# Patient Record
Sex: FEMALE | Race: WHITE | NOT HISPANIC OR LATINO | Employment: FULL TIME | URBAN - NONMETROPOLITAN AREA
[De-identification: names, ages, dates, MRNs, and addresses within clinical notes are randomized per-mention and may not be internally consistent; named-entity substitution may affect disease eponyms.]

---

## 2023-07-18 PROBLEM — R73.03 PREDIABETES: Status: ACTIVE | Noted: 2023-07-18

## 2023-07-18 PROBLEM — M93.1 KIENBOCK'S DISEASE OF LUNATE BONE OF RIGHT WRIST IN ADULT: Status: ACTIVE | Noted: 2023-07-18

## 2023-07-18 PROBLEM — R94.31 ABNORMAL EKG: Status: ACTIVE | Noted: 2023-07-18

## 2023-07-18 PROBLEM — M79.7 FIBROMYALGIA: Status: ACTIVE | Noted: 2023-07-18

## 2023-07-18 PROBLEM — M25.531 RIGHT WRIST PAIN: Status: ACTIVE | Noted: 2023-07-18

## 2023-07-18 PROBLEM — R06.02 SHORTNESS OF BREATH ON EXERTION: Status: ACTIVE | Noted: 2023-07-18

## 2023-07-18 PROBLEM — M92.211 OSTEOCHONDROSIS OF LUNATE OF RIGHT WRIST: Status: ACTIVE | Noted: 2023-07-18

## 2023-07-18 PROBLEM — J43.9 EMPHYSEMA/COPD (MULTI): Status: ACTIVE | Noted: 2023-07-18

## 2023-07-19 ENCOUNTER — OFFICE VISIT (OUTPATIENT)
Dept: PRIMARY CARE | Facility: CLINIC | Age: 63
End: 2023-07-19
Payer: COMMERCIAL

## 2023-07-19 ENCOUNTER — LAB (OUTPATIENT)
Dept: LAB | Facility: LAB | Age: 63
End: 2023-07-19
Payer: COMMERCIAL

## 2023-07-19 VITALS
SYSTOLIC BLOOD PRESSURE: 110 MMHG | HEART RATE: 86 BPM | BODY MASS INDEX: 25.39 KG/M2 | RESPIRATION RATE: 16 BRPM | OXYGEN SATURATION: 94 % | TEMPERATURE: 97.8 F | HEIGHT: 60 IN | WEIGHT: 129.3 LBS | DIASTOLIC BLOOD PRESSURE: 67 MMHG

## 2023-07-19 DIAGNOSIS — Z00.00 ANNUAL PHYSICAL EXAM: ICD-10-CM

## 2023-07-19 DIAGNOSIS — Z12.11 SCREEN FOR COLON CANCER: ICD-10-CM

## 2023-07-19 DIAGNOSIS — M17.11 PRIMARY OSTEOARTHRITIS OF RIGHT KNEE: ICD-10-CM

## 2023-07-19 DIAGNOSIS — Z00.00 ANNUAL PHYSICAL EXAM: Primary | ICD-10-CM

## 2023-07-19 DIAGNOSIS — M93.1 KIENBOCK'S DISEASE OF LUNATE BONE OF RIGHT WRIST IN ADULT: ICD-10-CM

## 2023-07-19 DIAGNOSIS — R73.03 PREDIABETES: ICD-10-CM

## 2023-07-19 DIAGNOSIS — J43.9 PULMONARY EMPHYSEMA, UNSPECIFIED EMPHYSEMA TYPE (MULTI): ICD-10-CM

## 2023-07-19 DIAGNOSIS — S83.241D TEAR OF MEDIAL MENISCUS OF RIGHT KNEE, CURRENT, UNSPECIFIED TEAR TYPE, SUBSEQUENT ENCOUNTER: ICD-10-CM

## 2023-07-19 DIAGNOSIS — Z53.20 MAMMOGRAM DECLINED: ICD-10-CM

## 2023-07-19 PROBLEM — R06.02 SHORTNESS OF BREATH ON EXERTION: Status: RESOLVED | Noted: 2023-07-18 | Resolved: 2023-07-19

## 2023-07-19 PROBLEM — S83.241A TEAR OF MEDIAL MENISCUS OF RIGHT KNEE, CURRENT: Status: ACTIVE | Noted: 2023-07-19

## 2023-07-19 PROBLEM — M25.531 RIGHT WRIST PAIN: Status: RESOLVED | Noted: 2023-07-18 | Resolved: 2023-07-19

## 2023-07-19 PROCEDURE — 1036F TOBACCO NON-USER: CPT | Performed by: FAMILY MEDICINE

## 2023-07-19 PROCEDURE — 36415 COLL VENOUS BLD VENIPUNCTURE: CPT

## 2023-07-19 PROCEDURE — 80061 LIPID PANEL: CPT

## 2023-07-19 PROCEDURE — 99396 PREV VISIT EST AGE 40-64: CPT | Performed by: FAMILY MEDICINE

## 2023-07-19 PROCEDURE — 85025 COMPLETE CBC W/AUTO DIFF WBC: CPT

## 2023-07-19 PROCEDURE — 80053 COMPREHEN METABOLIC PANEL: CPT

## 2023-07-19 PROCEDURE — 83036 HEMOGLOBIN GLYCOSYLATED A1C: CPT

## 2023-07-19 RX ORDER — VITAMIN B COMPLEX
TABLET ORAL
COMMUNITY
Start: 2010-05-17

## 2023-07-19 RX ORDER — MAGNESIUM HYDROXIDE 400 MG/5ML
SUSPENSION, ORAL (FINAL DOSE FORM) ORAL
COMMUNITY
Start: 2010-05-17

## 2023-07-19 RX ORDER — ALBUTEROL SULFATE 90 UG/1
AEROSOL, METERED RESPIRATORY (INHALATION)
COMMUNITY
End: 2023-07-19 | Stop reason: SDUPTHER

## 2023-07-19 RX ORDER — BUDESONIDE AND FORMOTEROL FUMARATE DIHYDRATE 160; 4.5 UG/1; UG/1
2 AEROSOL RESPIRATORY (INHALATION) 2 TIMES DAILY
COMMUNITY
End: 2023-07-19 | Stop reason: SDUPTHER

## 2023-07-19 RX ORDER — VIT C/E/ZN/COPPR/LUTEIN/ZEAXAN 250MG-90MG
CAPSULE ORAL
COMMUNITY
Start: 2010-05-17

## 2023-07-19 RX ORDER — BACLOFEN 20 MG
TABLET ORAL
COMMUNITY
Start: 2010-05-17

## 2023-07-19 RX ORDER — ALBUTEROL SULFATE 90 UG/1
AEROSOL, METERED RESPIRATORY (INHALATION)
Qty: 18 G | Refills: 3 | Status: SHIPPED | OUTPATIENT
Start: 2023-07-19

## 2023-07-19 RX ORDER — MELOXICAM 15 MG/1
15 TABLET ORAL DAILY
COMMUNITY
Start: 2023-06-22

## 2023-07-19 RX ORDER — LATANOPROST 50 UG/ML
SOLUTION/ DROPS OPHTHALMIC
COMMUNITY
Start: 2022-07-20

## 2023-07-19 RX ORDER — ASCORBIC ACID 500 MG
TABLET ORAL
COMMUNITY
Start: 2010-05-17

## 2023-07-19 RX ORDER — TRAMADOL HYDROCHLORIDE 50 MG/1
TABLET ORAL
COMMUNITY

## 2023-07-19 RX ORDER — BUDESONIDE AND FORMOTEROL FUMARATE DIHYDRATE 160; 4.5 UG/1; UG/1
2 AEROSOL RESPIRATORY (INHALATION) 2 TIMES DAILY
Qty: 1 EACH | Refills: 11 | Status: SHIPPED | OUTPATIENT
Start: 2023-07-19

## 2023-07-19 RX ORDER — MULTIVITAMIN
TABLET ORAL
COMMUNITY
Start: 2010-05-17

## 2023-07-19 ASSESSMENT — ENCOUNTER SYMPTOMS
FEVER: 0
HEADACHES: 0
DIARRHEA: 0
FATIGUE: 0
WEAKNESS: 0
NAUSEA: 0
CONSTIPATION: 0
NUMBNESS: 0
CHILLS: 0
PSYCHIATRIC NEGATIVE: 1
ABDOMINAL PAIN: 0
SHORTNESS OF BREATH: 0
COUGH: 0
SORE THROAT: 0
DIZZINESS: 0
VOMITING: 0
PALPITATIONS: 0
MUSCULOSKELETAL NEGATIVE: 1

## 2023-07-19 ASSESSMENT — PATIENT HEALTH QUESTIONNAIRE - PHQ9
SUM OF ALL RESPONSES TO PHQ9 QUESTIONS 1 AND 2: 0
2. FEELING DOWN, DEPRESSED OR HOPELESS: NOT AT ALL
1. LITTLE INTEREST OR PLEASURE IN DOING THINGS: NOT AT ALL

## 2023-07-19 NOTE — PROGRESS NOTES
Subjective   Patient ID: Albina Ramirez is a 62 y.o. female who presents for Annual Exam.    HPI     The patient presents for her annual wellness exam.  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 Lewis   MRI just done showed R knee severe OA with medial meniscus tear   She continues to have right medial knee pain   Has had 2 injections without much improvement     Review of Systems   Constitutional:  Negative for chills, fatigue and fever.   HENT:  Negative for congestion, ear pain and sore throat.    Eyes:  Negative for visual disturbance.   Respiratory:  Negative for cough and shortness of breath.    Cardiovascular:  Negative for chest pain, palpitations and leg swelling.   Gastrointestinal:  Negative for abdominal pain, constipation, diarrhea, nausea and vomiting.   Genitourinary: Negative.    Musculoskeletal: Negative.    Skin:  Negative for rash.   Neurological:  Negative for dizziness, weakness, numbness and headaches.   Psychiatric/Behavioral: Negative.         Objective   /67 (BP Location: Left arm, Patient Position: Sitting, BP Cuff Size: Adult)   Pulse 86   Temp 36.6 °C (97.8 °F) (Temporal)   Resp 16   Ht 1.518 m  "(4' 11.75\")   Wt 58.7 kg (129 lb 4.8 oz)   SpO2 94%   BMI 25.46 kg/m²     Physical Exam    Constitutional: Well developed, well nourished, alert and in no acute distress.  Head and Face: Normocephalic, atraumatic.  Eyes: Normal external exam. Pupils equally round and reactive to light with normal accommodation and extraocular movements intact.   ENT: External inspection of ears normal, tympanic membranes visualized and normal. Nasal mucosa, septum, and turbinates normal. Oral mucosa moist, oropharynx clear.   Neck: Supple, no lymphadenopathy or masses. Thyroid not enlarged, no palpable nodules.   Cardiovascular: Regular rate and rhythm, normal S1 and S2, no murmurs, gallops, or rubs. Radial pulses normal. No peripheral edema. No carotid bruits.   Pulmonary: No respiratory distress, lungs clear to auscultation bilaterally. No wheezes, rhonchi, rales.   Abdomen: Soft, nontender, nondistended, normal bowel sounds. No masses palpated.   Musculoskeletal: Gait normal. Muscle strength/tone normal of all 4 extremities. Normal range of motion of all extremities.   Skin: Warm, well perfused, normal skin turgor and color, no lesions or rashes noted.   Neurologic: Cranial nerves II-XII grossly intact. Sensation normal bilaterally.   Psychiatric: Mood calm and affect normal.      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 Lewis           Other Visit Diagnoses       Annual physical exam    -  Primary    " Relevant Orders    CBC and Auto Differential    Comprehensive Metabolic Panel    Lipid Panel    Screen for colon cancer        Relevant Orders    Cologuard® colon cancer screening    Mammogram declined              Nori Mendez, DO  7/19/2023

## 2023-07-19 NOTE — PATIENT INSTRUCTIONS
Recommendations for women annual wellness exam:   Make sure screenings for cervical, breast, and colon cancer are up to date if applicable- pap smears age 21-65, discuss mammogram starting at age 40, colonoscopy at age 45, earlier if positive family history for breast or colon cancer   Screen for osteoporosis with DXA bone scan starting at age 65 or sooner if risk factors present (long term steroid use, smoking, heavy alcohol use, history of fracture, rheumatoid arthritis, low body weight, family history of hip fracture)  Screening for lung cancer with low-dose CT in all adults age 50-80 who have a 20 pack-year smoking history and currently smoke or have quit within the past 15 years  Follow a healthy diet (Dash diet, Mediterranean diet)  Exercise 150 min/wk   Maintain healthy weight (BMI < 25)   Do not smoke   Alcohol in moderation (up to 1 drink/day)  Get enough sleep (7-8 hours/night)  Make sure immunizations are up to date (influenza, pneumococcal, Tdap, shingles if age > 50)  Postmenopausal women or women with osteoporosis need minimum 1,200 mg calcium and 800 IU vitamin D daily  Talk to your physician if you have concerns about depression or anxiety  Visit dentist twice yearly

## 2023-07-20 LAB
ALANINE AMINOTRANSFERASE (SGPT) (U/L) IN SER/PLAS: 24 U/L (ref 7–45)
ALBUMIN (G/DL) IN SER/PLAS: 4.2 G/DL (ref 3.4–5)
ALKALINE PHOSPHATASE (U/L) IN SER/PLAS: 89 U/L (ref 33–136)
ANION GAP IN SER/PLAS: 14 MMOL/L (ref 10–20)
ASPARTATE AMINOTRANSFERASE (SGOT) (U/L) IN SER/PLAS: 26 U/L (ref 9–39)
BASOPHILS (10*3/UL) IN BLOOD BY AUTOMATED COUNT: 0.05 X10E9/L (ref 0–0.1)
BASOPHILS/100 LEUKOCYTES IN BLOOD BY AUTOMATED COUNT: 0.7 % (ref 0–2)
BILIRUBIN TOTAL (MG/DL) IN SER/PLAS: 0.3 MG/DL (ref 0–1.2)
CALCIUM (MG/DL) IN SER/PLAS: 9.9 MG/DL (ref 8.6–10.6)
CARBON DIOXIDE, TOTAL (MMOL/L) IN SER/PLAS: 28 MMOL/L (ref 21–32)
CHLORIDE (MMOL/L) IN SER/PLAS: 104 MMOL/L (ref 98–107)
CHOLESTEROL (MG/DL) IN SER/PLAS: 176 MG/DL (ref 0–199)
CHOLESTEROL IN HDL (MG/DL) IN SER/PLAS: 61.8 MG/DL
CHOLESTEROL/HDL RATIO: 2.8
CREATININE (MG/DL) IN SER/PLAS: 0.87 MG/DL (ref 0.5–1.05)
EOSINOPHILS (10*3/UL) IN BLOOD BY AUTOMATED COUNT: 0.24 X10E9/L (ref 0–0.7)
EOSINOPHILS/100 LEUKOCYTES IN BLOOD BY AUTOMATED COUNT: 3.2 % (ref 0–6)
ERYTHROCYTE DISTRIBUTION WIDTH (RATIO) BY AUTOMATED COUNT: 13.7 % (ref 11.5–14.5)
ERYTHROCYTE MEAN CORPUSCULAR HEMOGLOBIN CONCENTRATION (G/DL) BY AUTOMATED: 32 G/DL (ref 32–36)
ERYTHROCYTE MEAN CORPUSCULAR VOLUME (FL) BY AUTOMATED COUNT: 107 FL (ref 80–100)
ERYTHROCYTES (10*6/UL) IN BLOOD BY AUTOMATED COUNT: 3.63 X10E12/L (ref 4–5.2)
ESTIMATED AVERAGE GLUCOSE FOR HBA1C: 108 MG/DL
GFR FEMALE: 76 ML/MIN/1.73M2
GLUCOSE (MG/DL) IN SER/PLAS: 102 MG/DL (ref 74–99)
HEMATOCRIT (%) IN BLOOD BY AUTOMATED COUNT: 38.8 % (ref 36–46)
HEMOGLOBIN (G/DL) IN BLOOD: 12.4 G/DL (ref 12–16)
HEMOGLOBIN A1C/HEMOGLOBIN TOTAL IN BLOOD: 5.4 %
IMMATURE GRANULOCYTES/100 LEUKOCYTES IN BLOOD BY AUTOMATED COUNT: 0.1 % (ref 0–0.9)
LDL: 83 MG/DL (ref 0–99)
LEUKOCYTES (10*3/UL) IN BLOOD BY AUTOMATED COUNT: 7.4 X10E9/L (ref 4.4–11.3)
LYMPHOCYTES (10*3/UL) IN BLOOD BY AUTOMATED COUNT: 1.73 X10E9/L (ref 1.2–4.8)
LYMPHOCYTES/100 LEUKOCYTES IN BLOOD BY AUTOMATED COUNT: 23.3 % (ref 13–44)
MONOCYTES (10*3/UL) IN BLOOD BY AUTOMATED COUNT: 0.68 X10E9/L (ref 0.1–1)
MONOCYTES/100 LEUKOCYTES IN BLOOD BY AUTOMATED COUNT: 9.1 % (ref 2–10)
NEUTROPHILS (10*3/UL) IN BLOOD BY AUTOMATED COUNT: 4.73 X10E9/L (ref 1.2–7.7)
NEUTROPHILS/100 LEUKOCYTES IN BLOOD BY AUTOMATED COUNT: 63.6 % (ref 40–80)
NRBC (PER 100 WBCS) BY AUTOMATED COUNT: 0 /100 WBC (ref 0–0)
PLATELETS (10*3/UL) IN BLOOD AUTOMATED COUNT: 288 X10E9/L (ref 150–450)
POTASSIUM (MMOL/L) IN SER/PLAS: 4.1 MMOL/L (ref 3.5–5.3)
PROTEIN TOTAL: 6.6 G/DL (ref 6.4–8.2)
SODIUM (MMOL/L) IN SER/PLAS: 142 MMOL/L (ref 136–145)
TRIGLYCERIDE (MG/DL) IN SER/PLAS: 157 MG/DL (ref 0–149)
UREA NITROGEN (MG/DL) IN SER/PLAS: 28 MG/DL (ref 6–23)
VLDL: 31 MG/DL (ref 0–40)

## 2023-07-24 ENCOUNTER — TELEPHONE (OUTPATIENT)
Dept: PRIMARY CARE | Facility: CLINIC | Age: 63
End: 2023-07-24
Payer: COMMERCIAL

## 2023-07-24 NOTE — TELEPHONE ENCOUNTER
Patient was notified and understood that PA was approved for her albuterol.   Advised patient that I will leave a vm to let pharmacy know that it is approved.     Left detailed message to notify pharmacy that her script was approved.

## 2023-08-15 LAB — NONINV COLON CA DNA+OCC BLD SCRN STL QL: NEGATIVE

## 2024-02-08 ENCOUNTER — TELEPHONE (OUTPATIENT)
Dept: PRIMARY CARE | Facility: CLINIC | Age: 64
End: 2024-02-08

## 2024-02-08 NOTE — TELEPHONE ENCOUNTER
Per patient, medical [employer] no longer will be covering brand name Symbicort    They are recommending only generic be called in from now on, I did inform her that the prescription sent in July does not specify brand name so she should not need a new prescription to be sent over    States this will be refilled next week and she will let us know if it need sent again

## 2024-08-15 DIAGNOSIS — J43.9 PULMONARY EMPHYSEMA, UNSPECIFIED EMPHYSEMA TYPE (MULTI): ICD-10-CM

## 2024-08-15 RX ORDER — BUDESONIDE AND FORMOTEROL FUMARATE DIHYDRATE 160; 4.5 UG/1; UG/1
2 AEROSOL RESPIRATORY (INHALATION) 2 TIMES DAILY
Qty: 10.2 G | Refills: 1 | Status: SHIPPED | OUTPATIENT
Start: 2024-08-15

## 2024-08-15 RX ORDER — ALBUTEROL SULFATE 90 UG/1
INHALANT RESPIRATORY (INHALATION)
Qty: 18 G | Refills: 0 | Status: SHIPPED | OUTPATIENT
Start: 2024-08-15

## 2024-09-11 PROBLEM — R94.31 ABNORMAL EKG: Status: RESOLVED | Noted: 2023-07-18 | Resolved: 2024-09-11

## 2024-09-11 PROBLEM — M93.1 KIENBOCK'S DISEASE OF LUNATE BONE OF RIGHT WRIST IN ADULT: Status: RESOLVED | Noted: 2023-07-18 | Resolved: 2024-09-11

## 2024-09-11 PROBLEM — M92.211 OSTEOCHONDROSIS OF LUNATE OF RIGHT WRIST: Status: RESOLVED | Noted: 2023-07-18 | Resolved: 2024-09-11

## 2024-09-11 NOTE — PROGRESS NOTES
"Subjective   Patient ID: Albina Ramirez is a 64 y.o. female who presents for Annual Exam.  HPI  Patient of Dr. 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   Quit smoking 2010, hx 1 ppd x 30+ years   PFTs not done due to work schedule and lack of time   No chronic cough   Breathing has improved with above inhalers   No significant illnesses this year    Pre-diabetes - recheck labs, no meds    Fibromyalgia/Knee arthritis - seeing [facility] ortho - received viscosupplements for knee - this was very helpful and it has been almost a year  Will be seeing Dr. Bonilla about the thumb  Dr. Islas ([facility]) for FM  uses NSAID's and tramadol    Objective   Visit Vitals  /67 (BP Location: Right arm, Patient Position: Sitting, BP Cuff Size: Adult)   Pulse 81   Temp 36.6 °C (97.9 °F) (Temporal)   Resp 14   Ht 1.499 m (4' 11\")   Wt 58.7 kg (129 lb 5.4 oz)   SpO2 93%   BMI 26.12 kg/m²   OB Status Postmenopausal   Smoking Status Former   BSA 1.56 m²      Physical Exam  Vitals reviewed.   Constitutional:       Appearance: Normal appearance.   Cardiovascular:      Rate and Rhythm: Normal rate and regular rhythm.   Pulmonary:      Effort: Pulmonary effort is normal.      Breath sounds: Normal breath sounds. Wheezes: fine wheezes with forced expiration.      Comments: Fine wheezes with forced expiration, good air movement throughout  Neurological:      Mental Status: She is alert.         Assessment/Plan   Problem List Items Addressed This Visit       Emphysema/COPD (Multi)    Relevant Medications    albuterol " (Ventolin HFA) 90 mcg/actuation inhaler    budesonide-formoteroL (Symbicort) 160-4.5 mcg/actuation inhaler    Prediabetes    Relevant Orders    Hemoglobin A1c     Other Visit Diagnoses       Routine general medical examination at a health care facility    -  Primary    Relevant Orders    Lipid panel    Comprehensive metabolic panel    Hemoglobin A1c    Breast cancer screening by mammogram        Relevant Orders    BI mammo bilateral screening tomosynthesis    Bruising        Relevant Orders    CBC

## 2024-09-16 ENCOUNTER — APPOINTMENT (OUTPATIENT)
Dept: PRIMARY CARE | Facility: CLINIC | Age: 64
End: 2024-09-16
Payer: COMMERCIAL

## 2024-09-16 VITALS
RESPIRATION RATE: 14 BRPM | HEIGHT: 59 IN | OXYGEN SATURATION: 93 % | TEMPERATURE: 97.9 F | BODY MASS INDEX: 26.08 KG/M2 | WEIGHT: 129.34 LBS | DIASTOLIC BLOOD PRESSURE: 67 MMHG | HEART RATE: 81 BPM | SYSTOLIC BLOOD PRESSURE: 110 MMHG

## 2024-09-16 DIAGNOSIS — Z00.00 ROUTINE GENERAL MEDICAL EXAMINATION AT A HEALTH CARE FACILITY: Primary | ICD-10-CM

## 2024-09-16 DIAGNOSIS — T14.8XXA BRUISING: ICD-10-CM

## 2024-09-16 DIAGNOSIS — Z12.31 BREAST CANCER SCREENING BY MAMMOGRAM: ICD-10-CM

## 2024-09-16 DIAGNOSIS — J43.9 PULMONARY EMPHYSEMA, UNSPECIFIED EMPHYSEMA TYPE (MULTI): ICD-10-CM

## 2024-09-16 DIAGNOSIS — R73.03 PREDIABETES: ICD-10-CM

## 2024-09-16 PROCEDURE — 1036F TOBACCO NON-USER: CPT | Performed by: FAMILY MEDICINE

## 2024-09-16 PROCEDURE — 3008F BODY MASS INDEX DOCD: CPT | Performed by: FAMILY MEDICINE

## 2024-09-16 PROCEDURE — 99396 PREV VISIT EST AGE 40-64: CPT | Performed by: FAMILY MEDICINE

## 2024-09-16 RX ORDER — BUDESONIDE AND FORMOTEROL FUMARATE DIHYDRATE 160; 4.5 UG/1; UG/1
2 AEROSOL RESPIRATORY (INHALATION) 2 TIMES DAILY
Qty: 10.2 G | Refills: 11 | Status: SHIPPED | OUTPATIENT
Start: 2024-09-16

## 2024-09-16 RX ORDER — ALBUTEROL SULFATE 90 UG/1
INHALANT RESPIRATORY (INHALATION)
Qty: 18 G | Refills: 3 | Status: SHIPPED | OUTPATIENT
Start: 2024-09-16

## 2024-09-16 NOTE — PATIENT INSTRUCTIONS
Come back in for fasting labs, nothing to eat or drink except water or black coffee for 10-12 hours.  Lab is open 7:30-4 weekdays and 8-12 on Saturdays.     Schedule mammogram over Olamide break.

## 2024-09-21 ENCOUNTER — LAB (OUTPATIENT)
Dept: LAB | Facility: LAB | Age: 64
End: 2024-09-21
Payer: COMMERCIAL

## 2024-09-21 DIAGNOSIS — R73.03 PREDIABETES: ICD-10-CM

## 2024-09-21 DIAGNOSIS — T14.8XXA BRUISING: ICD-10-CM

## 2024-09-21 DIAGNOSIS — Z00.00 ROUTINE GENERAL MEDICAL EXAMINATION AT A HEALTH CARE FACILITY: ICD-10-CM

## 2024-09-21 PROCEDURE — 85027 COMPLETE CBC AUTOMATED: CPT

## 2024-09-21 PROCEDURE — 36415 COLL VENOUS BLD VENIPUNCTURE: CPT

## 2024-09-21 PROCEDURE — 83036 HEMOGLOBIN GLYCOSYLATED A1C: CPT

## 2024-09-21 PROCEDURE — 80061 LIPID PANEL: CPT

## 2024-09-21 PROCEDURE — 80053 COMPREHEN METABOLIC PANEL: CPT

## 2024-09-22 LAB
ALBUMIN SERPL BCP-MCNC: 4.2 G/DL (ref 3.4–5)
ALP SERPL-CCNC: 67 U/L (ref 33–136)
ALT SERPL W P-5'-P-CCNC: 30 U/L (ref 7–45)
ANION GAP SERPL CALC-SCNC: 12 MMOL/L (ref 10–20)
AST SERPL W P-5'-P-CCNC: 29 U/L (ref 9–39)
BILIRUB SERPL-MCNC: 1 MG/DL (ref 0–1.2)
BUN SERPL-MCNC: 22 MG/DL (ref 6–23)
CALCIUM SERPL-MCNC: 9.4 MG/DL (ref 8.6–10.6)
CHLORIDE SERPL-SCNC: 105 MMOL/L (ref 98–107)
CHOLEST SERPL-MCNC: 180 MG/DL (ref 0–199)
CHOLESTEROL/HDL RATIO: 2.9
CO2 SERPL-SCNC: 27 MMOL/L (ref 21–32)
CREAT SERPL-MCNC: 0.84 MG/DL (ref 0.5–1.05)
EGFRCR SERPLBLD CKD-EPI 2021: 79 ML/MIN/1.73M*2
ERYTHROCYTE [DISTWIDTH] IN BLOOD BY AUTOMATED COUNT: 12.5 % (ref 11.5–14.5)
EST. AVERAGE GLUCOSE BLD GHB EST-MCNC: 105 MG/DL
GLUCOSE SERPL-MCNC: 90 MG/DL (ref 74–99)
HBA1C MFR BLD: 5.3 %
HCT VFR BLD AUTO: 42.7 % (ref 36–46)
HDLC SERPL-MCNC: 62.3 MG/DL
HGB BLD-MCNC: 13.8 G/DL (ref 12–16)
LDLC SERPL CALC-MCNC: 107 MG/DL
MCH RBC QN AUTO: 32.9 PG (ref 26–34)
MCHC RBC AUTO-ENTMCNC: 32.3 G/DL (ref 32–36)
MCV RBC AUTO: 102 FL (ref 80–100)
NON HDL CHOLESTEROL: 118 MG/DL (ref 0–149)
NRBC BLD-RTO: 0 /100 WBCS (ref 0–0)
PLATELET # BLD AUTO: 293 X10*3/UL (ref 150–450)
POTASSIUM SERPL-SCNC: 4.1 MMOL/L (ref 3.5–5.3)
PROT SERPL-MCNC: 6.7 G/DL (ref 6.4–8.2)
RBC # BLD AUTO: 4.19 X10*6/UL (ref 4–5.2)
SODIUM SERPL-SCNC: 140 MMOL/L (ref 136–145)
TRIGL SERPL-MCNC: 53 MG/DL (ref 0–149)
VLDL: 11 MG/DL (ref 0–40)
WBC # BLD AUTO: 7.2 X10*3/UL (ref 4.4–11.3)

## 2024-10-24 NOTE — PROGRESS NOTES
Subjective   Patient ID: Albina Ramirez is a 64 y.o. female who presents for Diarrhea.    HPI     Here today for diarrhea for 2-3 weeks -   Watery stools, soft   No blood or melena  Freq was 5-6 times daily at onset   No diarrhea past 2 days - has since subsided   No N/V  Slight abd cramping before BM  No new meds  No recent antibiotics  No travel   Cologuard 8/15/23 neg   No prior colonoscopy   She used imodium, pepto bismol      Current Outpatient Medications   Medication Sig Dispense Refill    albuterol (Ventolin HFA) 90 mcg/actuation inhaler INHALE 1 TO 2 PUFFS BY MOUTH EVERY 4 TO 6 HOURS AS NEEDED 18 g 3    ascorbic acid (Vitamin C) 500 mg tablet Take by mouth.      budesonide-formoteroL (Symbicort) 160-4.5 mcg/actuation inhaler Inhale 2 puffs 2 times a day. Rinse mouth after use. 10.2 g 11    cholecalciferol (Vitamin D-3) 25 MCG (1000 UT) capsule Take by mouth.      cyanocobalamin, vitamin B-12, 2,500 mcg tablet, sublingual Place under the tongue.      GLUCOSAMINE SULFATE ORAL Take by mouth.      latanoprost (Xalatan) 0.005 % ophthalmic solution Administer into affected eye(s).      magnesium oxide 500 mg tablet Take by mouth.      meloxicam (Mobic) 15 mg tablet Take 1 tablet (15 mg) by mouth once daily.      multivitamin tablet Take by mouth.      potassium gluconate 595 mg (99 mg) tablet Take by mouth.      traMADol (Ultram) 50 mg tablet TAKE 1 TABLET BY MOUTH once or TWICE DAILY AS NEEDED FOR SEVERE PAIN       No current facility-administered medications for this visit.       Review of Systems   Constitutional:  Negative for fever.   Gastrointestinal:  Positive for abdominal pain and diarrhea. Negative for blood in stool, constipation, nausea and vomiting.   Genitourinary: Negative.          Objective   /68 (BP Location: Left arm, Patient Position: Sitting, BP Cuff Size: Adult)   Pulse 70   Temp 37.1 °C (98.8 °F) (Temporal)   Resp 12   Wt 55.2 kg (121 lb 12.8 oz)   SpO2 95%   BMI 24.60 kg/m²      Physical Exam    Constitutional: Well developed, well nourished, alert and in no acute distress   Eyes: Normal external exam.   Abdomen: Soft, nontender, nondistended, +BS.  Skin: Warm, well perfused, normal skin turgor and color.   Neurologic: Cranial nerves II-XII grossly intact.   Psychiatric: Mood calm and affect normal.      Assessment/Plan     Problem List Items Addressed This Visit    None  Visit Diagnoses       Watery diarrhea    -  Primary    Relevant Orders    Occult Blood, Stool    Ova/Para + Giardia/Cryptosporidium Antigen    Stool Pathogen Panel, PCR    Calprotectin, Fecal    C. difficile, PCR    Abdominal cramping              Discussed infectious vs inflammatory causes  Stool studies ordered  Consider GI referral for colonoscopy if symptoms persist   Imodium and pepto bismol are fine to continue as needed     Nori Mendez, DO  10/25/2024

## 2024-10-25 ENCOUNTER — OFFICE VISIT (OUTPATIENT)
Dept: PRIMARY CARE | Facility: CLINIC | Age: 64
End: 2024-10-25
Payer: COMMERCIAL

## 2024-10-25 VITALS
WEIGHT: 121.8 LBS | SYSTOLIC BLOOD PRESSURE: 124 MMHG | RESPIRATION RATE: 12 BRPM | DIASTOLIC BLOOD PRESSURE: 68 MMHG | BODY MASS INDEX: 24.6 KG/M2 | TEMPERATURE: 98.8 F | OXYGEN SATURATION: 95 % | HEART RATE: 70 BPM

## 2024-10-25 DIAGNOSIS — R10.9 ABDOMINAL CRAMPING: ICD-10-CM

## 2024-10-25 DIAGNOSIS — R19.7 WATERY DIARRHEA: Primary | ICD-10-CM

## 2024-10-25 PROCEDURE — 99213 OFFICE O/P EST LOW 20 MIN: CPT | Performed by: FAMILY MEDICINE

## 2024-10-25 PROCEDURE — 1036F TOBACCO NON-USER: CPT | Performed by: FAMILY MEDICINE

## 2024-10-25 ASSESSMENT — ENCOUNTER SYMPTOMS
VOMITING: 0
BLOOD IN STOOL: 0
NAUSEA: 0
ABDOMINAL PAIN: 1
DIARRHEA: 1
CONSTIPATION: 0
FEVER: 0

## 2024-10-28 ENCOUNTER — LAB (OUTPATIENT)
Dept: LAB | Facility: LAB | Age: 64
End: 2024-10-28
Payer: COMMERCIAL

## 2024-10-28 DIAGNOSIS — R19.7 WATERY DIARRHEA: ICD-10-CM

## 2024-10-28 PROCEDURE — 87493 C DIFF AMPLIFIED PROBE: CPT

## 2024-10-28 PROCEDURE — 82270 OCCULT BLOOD FECES: CPT

## 2024-10-28 PROCEDURE — 87328 CRYPTOSPORIDIUM AG IA: CPT

## 2024-10-28 PROCEDURE — 83993 ASSAY FOR CALPROTECTIN FECAL: CPT

## 2024-10-28 PROCEDURE — 87329 GIARDIA AG IA: CPT

## 2024-10-28 PROCEDURE — 87506 IADNA-DNA/RNA PROBE TQ 6-11: CPT

## 2024-10-29 LAB — C DIF TOX TCDA+TCDB STL QL NAA+PROBE: NOT DETECTED

## 2024-10-30 LAB
C COLI+JEJ+UPSA DNA STL QL NAA+PROBE: NOT DETECTED
EC STX1 GENE STL QL NAA+PROBE: NOT DETECTED
EC STX2 GENE STL QL NAA+PROBE: NOT DETECTED
HEMOCCULT SP1 STL QL: NEGATIVE
NOROVIRUS GI + GII RNA STL NAA+PROBE: NOT DETECTED
RV RNA STL NAA+PROBE: NOT DETECTED
SALMONELLA DNA STL QL NAA+PROBE: NOT DETECTED
SHIGELLA DNA SPEC QL NAA+PROBE: NOT DETECTED
V CHOLERAE DNA STL QL NAA+PROBE: NOT DETECTED
Y ENTEROCOL DNA STL QL NAA+PROBE: NOT DETECTED

## 2024-10-31 LAB
CRYPTOSP AG STL QL IA: NEGATIVE
G LAMBLIA AG STL QL IA: NEGATIVE

## 2024-11-03 LAB
CALPROTECTIN STL-MCNT: 108 UG/G
O+P STL MICRO: NEGATIVE

## 2024-11-04 DIAGNOSIS — R10.9 ABDOMINAL CRAMPING: ICD-10-CM

## 2024-11-04 DIAGNOSIS — R19.5 ELEVATED FECAL CALPROTECTIN: Primary | ICD-10-CM

## 2024-11-04 DIAGNOSIS — R19.7 WATERY DIARRHEA: ICD-10-CM

## 2024-12-26 VITALS
TEMPERATURE: 98.3 F | RESPIRATION RATE: 4 BRPM | RESPIRATION RATE: 6 BRPM | RESPIRATION RATE: 20 BRPM | OXYGEN SATURATION: 99 % | DIASTOLIC BLOOD PRESSURE: 35 MMHG | RESPIRATION RATE: 29 BRPM | HEART RATE: 78 BPM | SYSTOLIC BLOOD PRESSURE: 122 MMHG | DIASTOLIC BLOOD PRESSURE: 59 MMHG | DIASTOLIC BLOOD PRESSURE: 30 MMHG | SYSTOLIC BLOOD PRESSURE: 79 MMHG | HEART RATE: 92 BPM | SYSTOLIC BLOOD PRESSURE: 83 MMHG | OXYGEN SATURATION: 100 % | RESPIRATION RATE: 20 BRPM | OXYGEN SATURATION: 98 % | HEART RATE: 104 BPM | DIASTOLIC BLOOD PRESSURE: 66 MMHG | OXYGEN SATURATION: 98 % | HEART RATE: 76 BPM | DIASTOLIC BLOOD PRESSURE: 43 MMHG | HEART RATE: 80 BPM | SYSTOLIC BLOOD PRESSURE: 79 MMHG | SYSTOLIC BLOOD PRESSURE: 83 MMHG | RESPIRATION RATE: 25 BRPM | DIASTOLIC BLOOD PRESSURE: 31 MMHG | HEART RATE: 72 BPM | SYSTOLIC BLOOD PRESSURE: 91 MMHG | DIASTOLIC BLOOD PRESSURE: 66 MMHG | HEART RATE: 92 BPM | SYSTOLIC BLOOD PRESSURE: 100 MMHG | DIASTOLIC BLOOD PRESSURE: 37 MMHG | SYSTOLIC BLOOD PRESSURE: 84 MMHG | SYSTOLIC BLOOD PRESSURE: 135 MMHG | SYSTOLIC BLOOD PRESSURE: 100 MMHG | SYSTOLIC BLOOD PRESSURE: 96 MMHG | HEART RATE: 76 BPM | DIASTOLIC BLOOD PRESSURE: 37 MMHG | HEART RATE: 79 BPM | RESPIRATION RATE: 4 BRPM | SYSTOLIC BLOOD PRESSURE: 84 MMHG | OXYGEN SATURATION: 94 % | OXYGEN SATURATION: 99 % | SYSTOLIC BLOOD PRESSURE: 83 MMHG | HEART RATE: 74 BPM | RESPIRATION RATE: 25 BRPM | RESPIRATION RATE: 7 BRPM | SYSTOLIC BLOOD PRESSURE: 104 MMHG | OXYGEN SATURATION: 93 % | OXYGEN SATURATION: 98 % | DIASTOLIC BLOOD PRESSURE: 70 MMHG | OXYGEN SATURATION: 93 % | SYSTOLIC BLOOD PRESSURE: 91 MMHG | SYSTOLIC BLOOD PRESSURE: 100 MMHG | DIASTOLIC BLOOD PRESSURE: 38 MMHG | SYSTOLIC BLOOD PRESSURE: 84 MMHG | HEART RATE: 74 BPM | SYSTOLIC BLOOD PRESSURE: 150 MMHG | RESPIRATION RATE: 20 BRPM | OXYGEN SATURATION: 93 % | DIASTOLIC BLOOD PRESSURE: 38 MMHG | RESPIRATION RATE: 7 BRPM | HEART RATE: 79 BPM | RESPIRATION RATE: 7 BRPM | HEART RATE: 80 BPM | DIASTOLIC BLOOD PRESSURE: 35 MMHG | OXYGEN SATURATION: 99 % | HEART RATE: 100 BPM | HEART RATE: 79 BPM | SYSTOLIC BLOOD PRESSURE: 96 MMHG | RESPIRATION RATE: 18 BRPM | HEART RATE: 76 BPM | RESPIRATION RATE: 18 BRPM | DIASTOLIC BLOOD PRESSURE: 35 MMHG | HEART RATE: 89 BPM | HEART RATE: 77 BPM | DIASTOLIC BLOOD PRESSURE: 46 MMHG | DIASTOLIC BLOOD PRESSURE: 33 MMHG | HEART RATE: 82 BPM | DIASTOLIC BLOOD PRESSURE: 30 MMHG | HEART RATE: 88 BPM | DIASTOLIC BLOOD PRESSURE: 59 MMHG | RESPIRATION RATE: 31 BRPM | HEART RATE: 88 BPM | SYSTOLIC BLOOD PRESSURE: 124 MMHG | SYSTOLIC BLOOD PRESSURE: 150 MMHG | SYSTOLIC BLOOD PRESSURE: 91 MMHG | SYSTOLIC BLOOD PRESSURE: 122 MMHG | HEART RATE: 70 BPM | DIASTOLIC BLOOD PRESSURE: 43 MMHG | SYSTOLIC BLOOD PRESSURE: 150 MMHG | SYSTOLIC BLOOD PRESSURE: 130 MMHG | SYSTOLIC BLOOD PRESSURE: 102 MMHG | DIASTOLIC BLOOD PRESSURE: 66 MMHG | DIASTOLIC BLOOD PRESSURE: 33 MMHG | RESPIRATION RATE: 31 BRPM | RESPIRATION RATE: 29 BRPM | OXYGEN SATURATION: 94 % | HEART RATE: 78 BPM | RESPIRATION RATE: 29 BRPM | HEART RATE: 89 BPM | DIASTOLIC BLOOD PRESSURE: 70 MMHG | SYSTOLIC BLOOD PRESSURE: 124 MMHG | SYSTOLIC BLOOD PRESSURE: 104 MMHG | SYSTOLIC BLOOD PRESSURE: 102 MMHG | HEART RATE: 77 BPM | DIASTOLIC BLOOD PRESSURE: 38 MMHG | HEART RATE: 82 BPM | DIASTOLIC BLOOD PRESSURE: 37 MMHG | SYSTOLIC BLOOD PRESSURE: 79 MMHG | HEART RATE: 72 BPM | DIASTOLIC BLOOD PRESSURE: 70 MMHG | RESPIRATION RATE: 4 BRPM | DIASTOLIC BLOOD PRESSURE: 31 MMHG | DIASTOLIC BLOOD PRESSURE: 64 MMHG | SYSTOLIC BLOOD PRESSURE: 130 MMHG | SYSTOLIC BLOOD PRESSURE: 102 MMHG | HEART RATE: 74 BPM | HEART RATE: 70 BPM | HEART RATE: 70 BPM | SYSTOLIC BLOOD PRESSURE: 104 MMHG | SYSTOLIC BLOOD PRESSURE: 124 MMHG | TEMPERATURE: 98.3 F | RESPIRATION RATE: 10 BRPM | SYSTOLIC BLOOD PRESSURE: 122 MMHG | SYSTOLIC BLOOD PRESSURE: 135 MMHG | HEART RATE: 92 BPM | RESPIRATION RATE: 10 BRPM | RESPIRATION RATE: 18 BRPM | HEART RATE: 100 BPM | RESPIRATION RATE: 6 BRPM | DIASTOLIC BLOOD PRESSURE: 46 MMHG | HEART RATE: 72 BPM | DIASTOLIC BLOOD PRESSURE: 46 MMHG | HEART RATE: 82 BPM | DIASTOLIC BLOOD PRESSURE: 43 MMHG | DIASTOLIC BLOOD PRESSURE: 33 MMHG | DIASTOLIC BLOOD PRESSURE: 64 MMHG | HEART RATE: 88 BPM | HEART RATE: 80 BPM | RESPIRATION RATE: 6 BRPM | HEART RATE: 104 BPM | HEART RATE: 100 BPM | RESPIRATION RATE: 31 BRPM | OXYGEN SATURATION: 100 % | HEART RATE: 77 BPM | RESPIRATION RATE: 25 BRPM | DIASTOLIC BLOOD PRESSURE: 31 MMHG | OXYGEN SATURATION: 94 % | SYSTOLIC BLOOD PRESSURE: 130 MMHG | TEMPERATURE: 98.3 F | SYSTOLIC BLOOD PRESSURE: 96 MMHG | RESPIRATION RATE: 10 BRPM | HEART RATE: 89 BPM | HEART RATE: 78 BPM | DIASTOLIC BLOOD PRESSURE: 64 MMHG | DIASTOLIC BLOOD PRESSURE: 59 MMHG | OXYGEN SATURATION: 100 % | SYSTOLIC BLOOD PRESSURE: 135 MMHG | HEART RATE: 104 BPM | DIASTOLIC BLOOD PRESSURE: 30 MMHG

## 2024-12-30 ENCOUNTER — HOSPITAL ENCOUNTER (OUTPATIENT)
Dept: RADIOLOGY | Facility: CLINIC | Age: 64
Discharge: HOME | End: 2024-12-30
Payer: COMMERCIAL

## 2024-12-30 VITALS — HEIGHT: 59 IN | WEIGHT: 121.69 LBS | BODY MASS INDEX: 24.53 KG/M2

## 2024-12-30 DIAGNOSIS — Z12.31 BREAST CANCER SCREENING BY MAMMOGRAM: ICD-10-CM

## 2024-12-30 PROCEDURE — 77067 SCR MAMMO BI INCL CAD: CPT | Performed by: RADIOLOGY

## 2024-12-30 PROCEDURE — 77067 SCR MAMMO BI INCL CAD: CPT

## 2024-12-30 PROCEDURE — 77063 BREAST TOMOSYNTHESIS BI: CPT | Performed by: RADIOLOGY

## 2025-01-01 PROBLEM — K52.89 CLINICALLY SIGNIFICANT DIARRHEA OF UNEXPLAINED ORIGIN: Status: ACTIVE | Noted: 2025-01-03

## 2025-01-02 ENCOUNTER — HOSPITAL ENCOUNTER (OUTPATIENT)
Dept: RADIOLOGY | Facility: EXTERNAL LOCATION | Age: 65
Discharge: HOME | End: 2025-01-02

## 2025-01-03 ENCOUNTER — AMBULATORY SURGICAL CENTER (OUTPATIENT)
Dept: URBAN - METROPOLITAN AREA SURGERY 12 | Facility: SURGERY | Age: 65
End: 2025-01-03
Payer: COMMERCIAL

## 2025-01-03 ENCOUNTER — AMBULATORY SURGICAL CENTER (OUTPATIENT)
Dept: URBAN - METROPOLITAN AREA SURGERY 12 | Facility: SURGERY | Age: 65
End: 2025-01-03

## 2025-01-03 DIAGNOSIS — K64.4 RESIDUAL HEMORRHOIDAL SKIN TAGS: ICD-10-CM

## 2025-01-03 DIAGNOSIS — K64.8 OTHER HEMORRHOIDS: ICD-10-CM

## 2025-01-03 DIAGNOSIS — R19.7 DIARRHEA, UNSPECIFIED: ICD-10-CM

## 2025-01-03 DIAGNOSIS — K63.89 OTHER SPECIFIED DISEASES OF INTESTINE: ICD-10-CM

## 2025-01-03 DIAGNOSIS — K52.832 LYMPHOCYTIC COLITIS: ICD-10-CM

## 2025-01-03 PROCEDURE — 45380 COLONOSCOPY AND BIOPSY: CPT | Performed by: INTERNAL MEDICINE

## 2025-03-03 ENCOUNTER — OFFICE (OUTPATIENT)
Dept: URBAN - METROPOLITAN AREA CLINIC 27 | Facility: CLINIC | Age: 65
End: 2025-03-03
Payer: COMMERCIAL

## 2025-03-03 VITALS
SYSTOLIC BLOOD PRESSURE: 151 MMHG | DIASTOLIC BLOOD PRESSURE: 74 MMHG | SYSTOLIC BLOOD PRESSURE: 143 MMHG | DIASTOLIC BLOOD PRESSURE: 71 MMHG | WEIGHT: 120 LBS | HEART RATE: 76 BPM

## 2025-03-03 DIAGNOSIS — K52.832 LYMPHOCYTIC COLITIS: ICD-10-CM

## 2025-03-03 PROCEDURE — 99213 OFFICE O/P EST LOW 20 MIN: CPT | Performed by: NURSE PRACTITIONER

## 2025-03-03 RX ORDER — CALCIUM POLYCARBOPHIL 625 MG/1
1250 TABLET, FILM COATED ORAL
Qty: 60 | Refills: 11 | Status: ACTIVE
Start: 2025-03-03

## 2025-03-15 ENCOUNTER — OFFICE VISIT (OUTPATIENT)
Dept: URGENT CARE | Age: 65
End: 2025-03-15
Payer: COMMERCIAL

## 2025-03-15 VITALS
SYSTOLIC BLOOD PRESSURE: 146 MMHG | HEIGHT: 60 IN | RESPIRATION RATE: 18 BRPM | BODY MASS INDEX: 23.56 KG/M2 | HEART RATE: 78 BPM | TEMPERATURE: 96.9 F | WEIGHT: 120 LBS | DIASTOLIC BLOOD PRESSURE: 73 MMHG | OXYGEN SATURATION: 97 %

## 2025-03-15 DIAGNOSIS — S41.111A SKIN TEAR OF RIGHT UPPER EXTREMITY: Primary | ICD-10-CM

## 2025-03-15 DIAGNOSIS — L03.113 CELLULITIS OF RIGHT UPPER EXTREMITY: ICD-10-CM

## 2025-03-15 RX ORDER — DOXYCYCLINE 100 MG/1
100 CAPSULE ORAL 2 TIMES DAILY
Qty: 20 CAPSULE | Refills: 0 | Status: SHIPPED | OUTPATIENT
Start: 2025-03-15 | End: 2025-03-25

## 2025-03-15 ASSESSMENT — ENCOUNTER SYMPTOMS
MYALGIAS: 0
FATIGUE: 0
HEADACHES: 0
FEVER: 0
WOUND: 1

## 2025-03-15 ASSESSMENT — PAIN SCALES - GENERAL: PAINLEVEL_OUTOF10: 4

## 2025-03-15 NOTE — PROGRESS NOTES
Subjective   Patient ID: Albina Ramirez is a 64 y.o. female. They present today with a chief complaint of Injury (Arm dog scratch).    History of Present Illness  Patient presents with concern for skin tear to right forearm from dog scratch. She states her friends dog jumped towards her and her claws rubbed on her arm, pt states she has paper thin skin. Incident happened 3 days ago, she has kept it covered with tegaderm and gauze. It has continued to bleed small amounts, and the dressing is stuck to the wound. She's unsure when her last tetanus booster was and would like one today.       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 07/18/2023    Personal history of diseases of the blood and blood-forming organs and certain disorders involving the immune mechanism 12/24/2020    History of anemia    Personal history of pneumonia (recurrent)     History of pneumonia    Screen for colon cancer 08/15/2023    8/15/23 cologuard neg    Screening for cervical cancer 07/16/2020 7/16/20 NILM HPV neg       Past Surgical History:   Procedure Laterality Date    KNEE SURGERY Right     TONSILLECTOMY  07/18/2014    Tonsillectomy    TUBAL LIGATION  07/18/2014    Tubal Ligation    WRIST SURGERY Right         reports that she quit smoking about 25 years ago. Her smoking use included cigarettes. She started smoking about 60 years ago. She has a 52.5 pack-year smoking history. She has been exposed to tobacco smoke. She has never used smokeless tobacco. She reports current alcohol use. She reports that  she does not use drugs.    Review of Systems  Review of Systems   Constitutional:  Negative for fatigue and fever.   Musculoskeletal:  Negative for myalgias.   Skin:  Positive for wound.   Neurological:  Negative for headaches.                                  Objective    Vitals:    03/15/25 0816   BP: 146/73   BP Location: Left arm   Patient Position: Sitting   BP Cuff Size: Adult   Pulse: 78   Resp: 18   Temp: 36.1 °C (96.9 °F)   TempSrc: Temporal   SpO2: 97%   Weight: 54.4 kg (120 lb)   Height: 1.524 m (5')     No LMP recorded. Patient is postmenopausal.    Physical Exam    Procedures    Point of Care Test & Imaging Results from this visit  No results found for this visit on 03/15/25.   No results found.    Diagnostic study results (if any) were reviewed by SUNG Gonzalez.    Assessment/Plan   Allergies, medications, history, and pertinent labs/EKGs/Imaging reviewed by SUNG Gonzalez.     Medical Decision Making  Continue supportive measures, and symptom management. Provided prescription for antibiotic and discussed wound care. Provided updated tetanus booster. F/u if s/s increase or worsen.     At time of discharge patient was clinically well-appearing and HDS for outpatient management. The patient and/or family was educated regarding diagnosis, supportive care, OTC and Rx medications. The patient and/or family was given the opportunity to ask questions prior to discharge.  They verbalized understanding of my discussion of the plans for treatment, expected course, indications to return to  or seek further evaluation in ED, and the need for timely follow up as directed.   They were provided with a work/school excuse if requested.      Orders and Diagnoses  Diagnoses and all orders for this visit:  Skin tear of right upper extremity  -     doxycycline (Vibramycin) 100 mg capsule; Take 1 capsule (100 mg) by mouth 2 times a day for 10 days. Take with at least 8 ounces (large glass) of water, do  not lie down for 30 minutes after  Cellulitis of right upper extremity  -     doxycycline (Vibramycin) 100 mg capsule; Take 1 capsule (100 mg) by mouth 2 times a day for 10 days. Take with at least 8 ounces (large glass) of water, do not lie down for 30 minutes after  Other orders  -     Tdap vaccine, age 7 years and older  (BOOSTRIX)      Medical Admin Record      Patient disposition: Home    Electronically signed by SUNG Gonzalez  8:55 AM

## 2025-03-15 NOTE — PATIENT INSTRUCTIONS
Keep the wound clean, change the bandage and gently wash once a day, or as needed due to soiling. Apply a thin layer of vaseline to a non-stick gauze, apply that directly to the wound. Then cover with gauze wrap and coban to secure the dressing. If the dressing becomes stuck to the skin gently irrigate with warm water to release the dressing.

## 2025-05-05 ENCOUNTER — HOSPITAL ENCOUNTER (OUTPATIENT)
Dept: RADIOLOGY | Facility: CLINIC | Age: 65
Discharge: HOME | End: 2025-05-05
Payer: COMMERCIAL

## 2025-05-05 ENCOUNTER — OFFICE VISIT (OUTPATIENT)
Dept: PRIMARY CARE | Facility: CLINIC | Age: 65
End: 2025-05-05
Payer: COMMERCIAL

## 2025-05-05 VITALS
BODY MASS INDEX: 22.1 KG/M2 | SYSTOLIC BLOOD PRESSURE: 138 MMHG | OXYGEN SATURATION: 93 % | HEIGHT: 60 IN | HEART RATE: 77 BPM | WEIGHT: 112.6 LBS | DIASTOLIC BLOOD PRESSURE: 68 MMHG | TEMPERATURE: 97.5 F | RESPIRATION RATE: 16 BRPM

## 2025-05-05 DIAGNOSIS — M54.6 ACUTE BILATERAL THORACIC BACK PAIN: ICD-10-CM

## 2025-05-05 DIAGNOSIS — M54.6 ACUTE BILATERAL THORACIC BACK PAIN: Primary | ICD-10-CM

## 2025-05-05 PROBLEM — L03.113 CELLULITIS OF RIGHT UPPER EXTREMITY: Status: RESOLVED | Noted: 2025-03-15 | Resolved: 2025-05-05

## 2025-05-05 PROCEDURE — 3008F BODY MASS INDEX DOCD: CPT | Performed by: FAMILY MEDICINE

## 2025-05-05 PROCEDURE — 72072 X-RAY EXAM THORAC SPINE 3VWS: CPT | Performed by: RADIOLOGY

## 2025-05-05 PROCEDURE — 72072 X-RAY EXAM THORAC SPINE 3VWS: CPT

## 2025-05-05 PROCEDURE — 99213 OFFICE O/P EST LOW 20 MIN: CPT | Performed by: FAMILY MEDICINE

## 2025-05-05 PROCEDURE — 1036F TOBACCO NON-USER: CPT | Performed by: FAMILY MEDICINE

## 2025-05-05 PROCEDURE — 96372 THER/PROPH/DIAG INJ SC/IM: CPT | Performed by: FAMILY MEDICINE

## 2025-05-05 RX ORDER — CALCIUM POLYCARBOPHIL 625 MG/1
1 TABLET, FILM COATED ORAL
COMMUNITY
Start: 2025-03-03

## 2025-05-05 RX ORDER — KETOROLAC TROMETHAMINE 30 MG/ML
60 INJECTION, SOLUTION INTRAMUSCULAR; INTRAVENOUS ONCE
Status: COMPLETED | OUTPATIENT
Start: 2025-05-05 | End: 2025-05-05

## 2025-05-05 RX ADMIN — KETOROLAC TROMETHAMINE 60 MG: 30 INJECTION, SOLUTION INTRAMUSCULAR; INTRAVENOUS at 15:59

## 2025-05-05 NOTE — PROGRESS NOTES
Subjective   Patient ID: Albina Ramirez is a 64 y.o. female who presents for Flank Pain (Bilateral, worsening x approx 1 week/Pt denies fever, cough and urinary sxs/No known injury).  History of Present Illness  Albina Ramirez is a 64 year old female who presents with bilateral mid-back pain.    She has been experiencing bilateral mid-back pain  at the level of the lower thoracic spine for approximately one week. The pain is located around the spine and radiates around her torso. Leaning back exacerbates the pain, and she describes a sensation of her ribs being out of place, particularly when bending over. There is a 'catching' feeling on both sides.    There are no preceding events that could have triggered the pain, and this is the first occurrence of such symptoms. She has no history of back issues and has never had her low back x-rayed, although she has had x-rays of her chest and knees in the past. A thoracic spine x-ray in 2016 showed an old compression fracture, but she has not had problems since then.    Bowel and urinary functions are normal. No coughing or breathing issues, although she mentions that coughing would be extremely painful. She has not noticed any changes in the curvature of her back over time and does not feel different when sitting in a chair.    For pain management, she has been taking tramadol (two per day), Aleve, and Advil, but none have provided significant relief. She also uses an ice pack and a heating pad. She was on prednisone for gastrointestinal issues in March and for her thumb around Thanksgiving, with an injection following the oral course.    She lives alone and does not have any family nearby.    Objective     /68 (BP Location: Right arm, Patient Position: Sitting, BP Cuff Size: Adult)   Pulse 77   Temp 36.4 °C (97.5 °F) (Temporal)   Resp 16   Ht 1.524 m (5')   Wt 51.1 kg (112 lb 9.6 oz)   SpO2 93%   BMI 21.99 kg/m²      Physical Exam   Patient has a pronounced  thoracic kyphosis, she is generally tender over the thoracic spine and the trunk to the right and left of the spine and into the axillary line. Heart has RRR, no murmur, lungs are CTAB. She is able to stand and sit easily from the chair.    Results  RADIOLOGY  Thoracic spine X-ray: Compression fracture (2016)  Knee X-ray: Chondrocalcinosis (2022)       Assessment & Plan  Pain of thoracic spine - likely related to kyphosis   History of compression fracture of the thoracic spine with recent exacerbation of severe, persistent bilateral mid-back pain, unrelieved by current analgesics. No prior similar episodes. Differential includes muscular strain due to postural changes and osteoporosis-related changes. Concern for a new compression fracture. Previous x-ray from 2016 showed a compression fracture; no recent imaging has been performed. Discussed potential for active compression fracture and vertebroplasty if confirmed. Explained x-ray's role in determining next steps. Discussed Toradol injection for immediate, non-narcotic pain relief without impairing driving. Considered muscle relaxants for nighttime use, with caution due to sedative effects.  - Order thoracic spine x-ray to assess for new or worsening compression fracture.  - Administer Toradol injection for immediate pain relief.  - Prescribe muscle relaxants for nighttime use to aid in pain management and sleep.  - Consider physical therapy for posture and muscle strengthening if x-ray does not show acute fracture.  - Advise continuation of current pain management regimen with tramadol, Aleve, and Advil as needed.    Patricia Meier MD     This medical note was created with the assistance of artificial intelligence (AI) for documentation purposes. The content has been reviewed and confirmed by the healthcare provider for accuracy and completeness. Patient consented to the use of audio recording and use of AI during their visit.

## 2025-05-06 ENCOUNTER — TELEPHONE (OUTPATIENT)
Dept: PRIMARY CARE | Facility: CLINIC | Age: 65
End: 2025-05-06
Payer: COMMERCIAL

## 2025-05-06 DIAGNOSIS — M54.6 ACUTE BILATERAL THORACIC BACK PAIN: Primary | ICD-10-CM

## 2025-05-06 RX ORDER — CYCLOBENZAPRINE HCL 10 MG
10 TABLET ORAL 3 TIMES DAILY PRN
Qty: 30 TABLET | Refills: 0 | Status: SHIPPED | OUTPATIENT
Start: 2025-05-06 | End: 2025-07-05

## 2025-05-06 NOTE — TELEPHONE ENCOUNTER
There was no medication sent in yesterday, we gave an injection in the office yesterday for help with pain. Muscle relaxers were discussed, I have sent this in to the pharmacy now.

## 2025-05-06 NOTE — TELEPHONE ENCOUNTER
Pt called in stating yesterday she saw Hardeep and she was to prescribed a medication for back pain and states the medication that was sent over prior is incorrect and wants to know will the new medication for her back be sent over to pharmacy    On call added

## 2025-05-07 DIAGNOSIS — S22.000A THORACIC COMPRESSION FRACTURE, CLOSED, INITIAL ENCOUNTER (MULTI): Primary | ICD-10-CM

## 2025-05-30 ENCOUNTER — APPOINTMENT (OUTPATIENT)
Dept: NEUROSURGERY | Facility: CLINIC | Age: 65
End: 2025-05-30
Payer: COMMERCIAL

## 2025-05-30 VITALS
HEART RATE: 77 BPM | BODY MASS INDEX: 21.6 KG/M2 | SYSTOLIC BLOOD PRESSURE: 130 MMHG | TEMPERATURE: 97.7 F | DIASTOLIC BLOOD PRESSURE: 82 MMHG | HEIGHT: 60 IN | WEIGHT: 110 LBS

## 2025-05-30 DIAGNOSIS — S22.000A THORACIC COMPRESSION FRACTURE, CLOSED, INITIAL ENCOUNTER (MULTI): ICD-10-CM

## 2025-05-30 PROCEDURE — 1036F TOBACCO NON-USER: CPT | Performed by: NURSE PRACTITIONER

## 2025-05-30 PROCEDURE — 3008F BODY MASS INDEX DOCD: CPT | Performed by: NURSE PRACTITIONER

## 2025-05-30 PROCEDURE — 99204 OFFICE O/P NEW MOD 45 MIN: CPT | Performed by: NURSE PRACTITIONER

## 2025-05-30 RX ORDER — LIDOCAINE 50 MG/G
1 PATCH TOPICAL DAILY
Qty: 14 PATCH | Refills: 0 | Status: SHIPPED | OUTPATIENT
Start: 2025-05-30 | End: 2025-06-29

## 2025-05-30 RX ORDER — HYDROCODONE BITARTRATE AND ACETAMINOPHEN 5; 325 MG/1; MG/1
1 TABLET ORAL EVERY 6 HOURS PRN
Qty: 28 TABLET | Refills: 0 | Status: SHIPPED | OUTPATIENT
Start: 2025-05-30 | End: 2025-06-06

## 2025-05-30 ASSESSMENT — PATIENT HEALTH QUESTIONNAIRE - PHQ9
1. LITTLE INTEREST OR PLEASURE IN DOING THINGS: NOT AT ALL
2. FEELING DOWN, DEPRESSED OR HOPELESS: NOT AT ALL
SUM OF ALL RESPONSES TO PHQ9 QUESTIONS 1 & 2: 0

## 2025-05-30 ASSESSMENT — PAIN SCALES - GENERAL: PAINLEVEL_OUTOF10: 7

## 2025-05-30 NOTE — PROGRESS NOTES
[facility] [place]  Department of Neurological Surgery  New Patient Visit    History of Present Illness:     Albina Ramirez is a 64 y.o. year old female who presents to the spine clinic with T7 and T8 compression fractures.  Patient states that about 2 months ago she started experiencing some thoracic back pain that radiated around to her chest.  She spoke with her daughter who is a physical therapist in [place] and was doing some exercises that did not help her alleviate any of the pain symptoms.  She then presented in early May to her PCP who ordered a thoracic x-ray which demonstrated T7 and T8 compression fractures.  Patient does not recall any inciting events however is 64 and postmenopausal and has never been worked up for osteoporosis which could be a contributing factor.  Due to her diagnosis of fibromyalgia she does follow with pain medicine team and reached out to them for pain control and they increased her tramadol which has not helped.  In order to get any additional dosing of a different medication she needs to have an appointment with the physician which is scheduled for 2025 but until then she has been suffering and has been unable to obtain any additional pain medications.  She is trialed Voltaren gel which she had at home which did not help her any significant amount.    Chief Complaint   Patient presents with    New Patient Visit     Patient is here today for c/o upper back pain. She describes the pain as a constant stabbing and muscle spasms.      systems reviewed and negative other than what is listed in the history of present illness  Problem List[1]  Medical History[2]  Surgical History[3]  Social History     Tobacco Use    Smoking status: Former     Current packs/day: 0.00     Average packs/day: 1.5 packs/day for 35.0 years (52.5 ttl pk-yrs)     Types: Cigarettes     Start date:      Quit date: 2000     Years since quittin.4     Passive exposure: Past     Smokeless tobacco: Never   Substance Use Topics    Alcohol use: Not Currently     family history includes Breast cancer in her mother and sister; Heart attack in her father.  Current Medications[4]  Allergies[5]    Physical Examination:     General: Well developed, awake/alert/oriented x3, no distress, alert and cooperative  Skin: Warm and dry, no lesions, no rashes  ENMT: Mucous membranes moist, no apparent injury, no lesions seen  Head/Neck: Neck Supple, no apparent injury  Respiratory/Thorax: Normal breath sounds with good chest expansion, thorax symmetric  Cardiovascular: No pitting edema, no JVD    Motor Strength: 5/5 Throughout bilateral upper extremities and bilateral lower extremities    Muscle Bulk: Normal and symmetric in all extremities     Paraspinal muscle spasm/tenderness present T7 and T8 bilaterally    Midline tenderness present T7 and T8    Sensation to light touch intact    Results:     I personally reviewed and interpreted the imaging results which included x-ray of the thoracic spine from 05/06/2025 which does demonstrate a T7 and T8 compression fractures with less than 50% vertebral body height loss and no retropulsion.    Assessment and Plan:     Albina Ramirez is a 64 y.o. year old female who presents to the spine clinic with T7 and T8 compression fractures.  Patient states that about 2 months ago she started experiencing some thoracic back pain that radiated around to her chest.  She spoke with her daughter who is a physical therapist in [place] and was doing some exercises that did not help her alleviate any of the pain symptoms.  She then presented in early May to her PCP who ordered a thoracic x-ray which demonstrated T7 and T8 compression fractures.  Patient does not recall any inciting events however is 64 and postmenopausal and has never been worked up for osteoporosis which could be a contributing factor.  Due to her diagnosis of fibromyalgia she does follow with pain medicine team and  reached out to them for pain control and they increased her tramadol which has not helped.  In order to get any additional dosing of a different medication she needs to have an appointment with the physician which is scheduled for 06/09/2025 but until then she has been suffering and has been unable to obtain any additional pain medications.  She is trialed Voltaren gel which she had at home which did not help her any significant amount.    On physical exam she is alert and orient x 3.  No focal deficits on neurologic exam.  She has 5/5 strength in all 4 extremities.  Negative Nelson sign bilaterally.  No hyperreflexia or clonus.  She has midline tenderness at T7 and T8 that radiates to bilateral paraspinal musculature.  She denies any loss of bowel or bladder control or saddle anesthesia.  She denies any other myelopathic symptoms.    Personally reviewed her x-ray of the thoracic spine from 05/06/2025 which does demonstrate a T7 and T8 compression fractures with less than 50% vertebral body height loss and no retropulsion.  Unfortunately she has never received any bracing and has been in significant pain.  I therefore am going to order her TLSO brace to help better support her spinal column and musculature to help provide her with some relief.  I have provided her with a prescription and she is going to fill it at a local orthotics provider in [facility].  I have also prescribed her some lidocaine patches and a very small supply of Vicodin to help her with her pain until she can see her pain medicine specialist.  She is aware that this is a one-time prescription and will not receive any refills.  I will have her get a repeat x-ray in approximately 5 weeks to evaluate for healing as well as see her in the office to see how she is progressing and how her pain is controlled.  She will contact the office in the meantime with any questions or concerns.      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 07/18/2023    Personal history of diseases of the blood and blood-forming organs and certain disorders involving the immune mechanism 12/24/2020    History of anemia    Personal history of pneumonia (recurrent)     History of pneumonia    Screen for colon cancer 08/15/2023    8/15/23 cologuard neg    Screening for cervical cancer 07/16/2020 7/16/20 NILM HPV neg   [3]   Past Surgical History:  Procedure Laterality Date    KNEE SURGERY Right     TONSILLECTOMY  07/18/2014    Tonsillectomy    TUBAL LIGATION  07/18/2014    Tubal Ligation    WRIST SURGERY Right    [4]   Current Outpatient Medications:     albuterol (Ventolin HFA) 90 mcg/actuation inhaler, INHALE 1 TO 2 PUFFS BY MOUTH EVERY 4 TO 6 HOURS AS NEEDED, Disp: 18 g, Rfl: 3    ascorbic acid (Vitamin C) 500 mg tablet, Take by mouth., Disp: , Rfl:     budesonide-formoteroL (Symbicort) 160-4.5 mcg/actuation inhaler, Inhale 2 puffs 2 times a day. Rinse mouth after use., Disp: 10.2 g, Rfl: 11    cholecalciferol (Vitamin D-3) 25 MCG (1000 UT) capsule, Take by mouth., Disp: , Rfl:     cyanocobalamin, vitamin B-12, 2,500 mcg tablet, sublingual, Place under the tongue., Disp: , Rfl:     cyclobenzaprine (Flexeril) 10 mg tablet, Take 1 tablet (10 mg) by mouth 3 times a day as needed for muscle spasms., Disp: 30 tablet, Rfl: 0    GLUCOSAMINE SULFATE ORAL, Take by mouth., Disp: , Rfl:     latanoprost (Xalatan) 0.005 % ophthalmic solution, Administer into affected eye(s)., Disp: , Rfl:      magnesium oxide 500 mg tablet, Take by mouth., Disp: , Rfl:     meloxicam (Mobic) 15 mg tablet, Take 1 tablet (15 mg) by mouth once daily., Disp: , Rfl:     multivitamin tablet, Take by mouth., Disp: , Rfl:     potassium gluconate 595 mg (99 mg) tablet, Take by mouth., Disp: , Rfl:     traMADol (Ultram) 50 mg tablet, TAKE 1 TABLET BY MOUTH once or TWICE DAILY AS NEEDED FOR SEVERE PAIN (Patient taking differently: Take 1 tablet (50 mg) by mouth 3 times a day.), Disp: , Rfl:     HYDROcodone-acetaminophen (Norco) 5-325 mg tablet, Take 1 tablet by mouth every 6 hours if needed for moderate pain (4 - 6) or severe pain (7 - 10) for up to 7 days., Disp: 28 tablet, Rfl: 0    lidocaine (Lidoderm) 5 % patch, Place 1 patch over 12 hours on the skin once daily. Remove & discard patch within 12 hours or as directed by MD., Disp: 14 patch, Rfl: 0  [5]   Allergies  Allergen Reactions    Bee Venom Protein (Honey Bee) Other    Codeine Other    Penicillins Other

## 2025-06-16 ENCOUNTER — TELEPHONE (OUTPATIENT)
Dept: NEUROSURGERY | Facility: CLINIC | Age: 65
End: 2025-06-16
Payer: COMMERCIAL

## 2025-06-18 ENCOUNTER — HOSPITAL ENCOUNTER (OUTPATIENT)
Dept: RADIOLOGY | Facility: CLINIC | Age: 65
Discharge: HOME | End: 2025-06-18
Payer: COMMERCIAL

## 2025-06-18 DIAGNOSIS — S22.000A THORACIC COMPRESSION FRACTURE, CLOSED, INITIAL ENCOUNTER (MULTI): ICD-10-CM

## 2025-06-18 PROCEDURE — 72072 X-RAY EXAM THORAC SPINE 3VWS: CPT

## 2025-06-18 PROCEDURE — 72072 X-RAY EXAM THORAC SPINE 3VWS: CPT | Performed by: RADIOLOGY

## 2025-06-26 ENCOUNTER — APPOINTMENT (OUTPATIENT)
Dept: NEUROSURGERY | Facility: CLINIC | Age: 65
End: 2025-06-26
Payer: COMMERCIAL

## 2025-06-26 VITALS
TEMPERATURE: 97.7 F | HEIGHT: 60 IN | BODY MASS INDEX: 21.48 KG/M2 | HEART RATE: 68 BPM | DIASTOLIC BLOOD PRESSURE: 84 MMHG | SYSTOLIC BLOOD PRESSURE: 128 MMHG

## 2025-06-26 DIAGNOSIS — S22.000A THORACIC COMPRESSION FRACTURE, CLOSED, INITIAL ENCOUNTER (MULTI): Primary | ICD-10-CM

## 2025-06-26 DIAGNOSIS — M81.0 AGE RELATED OSTEOPOROSIS, UNSPECIFIED PATHOLOGICAL FRACTURE PRESENCE: ICD-10-CM

## 2025-06-26 PROCEDURE — 99214 OFFICE O/P EST MOD 30 MIN: CPT | Performed by: NURSE PRACTITIONER

## 2025-06-26 PROCEDURE — 1036F TOBACCO NON-USER: CPT | Performed by: NURSE PRACTITIONER

## 2025-06-26 ASSESSMENT — PATIENT HEALTH QUESTIONNAIRE - PHQ9
SUM OF ALL RESPONSES TO PHQ9 QUESTIONS 1 AND 2: 1
1. LITTLE INTEREST OR PLEASURE IN DOING THINGS: SEVERAL DAYS
2. FEELING DOWN, DEPRESSED OR HOPELESS: NOT AT ALL

## 2025-06-26 ASSESSMENT — PAIN SCALES - GENERAL: PAINLEVEL_OUTOF10: 6

## 2025-06-26 NOTE — PROGRESS NOTES
[facility] [place]  Department of Neurological Surgery  Established Patient Visit    History of Present Illness:     Albina Ramirez is a 64 y.o. year old female who presents to the spine clinic with T7 and T8 compression fractures.  Patient was initially seen on 2025 after a 2-month history of experiencing some thoracic pain that radiated around her chest that prompted her to present to her PCP who ordered a thoracic x-ray which demonstrated a T7 and T8 compression fracture.  She did not recall any inciting events thus we discussed getting a workup for osteoporosis.  We trialed TLSO brace, lidocaine patches and plan to follow-up in 5 weeks with repeat x-ray which is why she is here today.    Chief Complaint   Patient presents with    Thoracic compression fracture, closed, initial encounter     Fuv- imaging  Describes as- stabbing, spasms  Location- mid back, bilat ribs [but more on the (R) side]  Pain is- constant     14/14 systems reviewed and negative other than what is listed in the history of present illness  Problem List[1]  Medical History[2]  Surgical History[3]  Social History     Tobacco Use    Smoking status: Former     Current packs/day: 0.00     Average packs/day: 1.5 packs/day for 35.0 years (52.5 ttl pk-yrs)     Types: Cigarettes     Start date:      Quit date: 2000     Years since quittin.5     Passive exposure: Past    Smokeless tobacco: Never   Substance Use Topics    Alcohol use: Not Currently     family history includes Breast cancer in her mother and sister; Heart attack in her father.  Current Medications[4]  Allergies[5]    Physical Examination:     General: Well developed, awake/alert/oriented x3, no distress, alert and cooperative  Skin: Warm and dry, no lesions, no rashes  ENMT: Mucous membranes moist, no apparent injury, no lesions seen  Head/Neck: Neck Supple, no apparent injury  Respiratory/Thorax: Normal breath sounds with good chest expansion, thorax  symmetric  Cardiovascular: No pitting edema, no JVD     Motor Strength: 5/5 Throughout bilateral upper extremities and bilateral lower extremities     Muscle Bulk: Normal and symmetric in all extremities     Paraspinal muscle spasm/tenderness present T7 and T8 bilaterally (R>L) and C5-7 right side     Midline tenderness present T7 and T8 and C5-7     Sensation to light touch intact    Results:     I personally reviewed and interpreted the imaging results which included x-ray of her thoracic spine from 06/18/2025 which showed relatively stable T7 and T8 compression fractures with no interval healing.      Assessment and Plan:     Albina Ramirez is a 64 y.o. year old female who presents to the spine clinic with T7 and T8 compression fractures.  Patient was initially seen on 05/30/2025 after a 2-month history of experiencing some thoracic pain that radiated around her chest that prompted her to present to her PCP who ordered a thoracic x-ray which demonstrated a T7 and T8 compression fracture.  She did not recall any inciting events thus we discussed getting a workup for osteoporosis.  We trialed TLSO brace, lidocaine patches and plan to follow-up in 5 weeks with repeat x-ray which is why she is here today.    Physical exam the patient is alert and orient x 3.  She has no focal deficits on neurologic exam.  She has 5/5 strength in all 4 extremities.  Midline tenderness still present at T7 and T8 that radiates to the bilateral paraspinal musculature (R >L).  She denies any loss of bowel or bladder control, saddle anesthesia, unsteady gait or any other myelopathic symptoms.  She did note some right upper extremity radicular pain which I stated was not coming from her T7/T8 fractures and that she most likely has some element of arthritis in her neck and that she can discuss with her daughter some physical therapy exercises to help strengthen her musculature and if needed can discuss injections with her already  established pain medicine provider.  She did not have any midline tenderness along the cervical spine but did have some right sided paraspinal muscular tenderness around C5-C7.    I personally reviewed her x-ray of her thoracic spine from 06/18/2025 which showed relatively stable T7 and T8 compression fractures with no interval healing.  I discussed with the patient that is a good thing that they have not worsened and that her pain as well as the fractures have remained stable and consistent.  At this point in time it has been almost 2 months since the fracture and I am uncertain if they will fully heal on their own.  I discussed 2 options with the patient and she is going to speak with her daughter and decide which route she would like to go.  The first option is to continue observation and allowing her body to try to heal these on her own.  If that choice is made she will contact the office to make an appointment for approximately 4 to 5 weeks with a repeat x-ray (order will be placed when she contact the office for an appointment).  The second option is to discuss kyphoplasty.   Patient requests if she were to go with this choice that it be done at [facility] due to proximity to her house.  I provided her the information for Dr. Mathis and she will again speak with her daughter and if she chooses to go with this option will contact his office to make an appointment and discuss the procedure.  I also provided her an order for DEXA scan today and she will follow-up with her primary care in regards to the results.  If that is the case then she will follow-up with me as needed.  In the meantime she will contact me with any questions or concerns.      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 07/18/2023    Personal history of diseases of the blood and blood-forming organs and certain disorders involving the immune mechanism 12/24/2020    History of anemia    Personal history of pneumonia (recurrent)     History of pneumonia    Screen for colon cancer 08/15/2023    8/15/23 cologuard neg    Screening for cervical cancer 07/16/2020 7/16/20 NILM HPV neg   [3]   Past Surgical History:  Procedure Laterality Date    KNEE SURGERY Right     TONSILLECTOMY  07/18/2014    Tonsillectomy    TUBAL LIGATION  07/18/2014    Tubal Ligation    WRIST SURGERY Right    [4]   Current Outpatient Medications:     albuterol (Ventolin HFA) 90 mcg/actuation inhaler, INHALE 1 TO 2 PUFFS BY MOUTH EVERY 4 TO 6 HOURS AS NEEDED, Disp: 18 g, Rfl: 3    ascorbic acid (Vitamin C) 500 mg tablet, Take by mouth., Disp: , Rfl:     budesonide-formoteroL (Symbicort) 160-4.5 mcg/actuation inhaler, Inhale 2 puffs 2 times a day. Rinse mouth after use., Disp: 10.2 g, Rfl: 11    cholecalciferol (Vitamin D-3) 25 MCG (1000 UT) capsule, Take by mouth., Disp: , Rfl:     cyanocobalamin, vitamin B-12, 2,500 mcg tablet, sublingual, Place under the tongue., Disp: , Rfl:     GLUCOSAMINE SULFATE ORAL, Take by mouth., Disp: , Rfl:     latanoprost (Xalatan) 0.005 % ophthalmic solution, Administer into affected eye(s)., Disp: , Rfl:     magnesium oxide 500 mg tablet, Take by mouth., Disp: , Rfl:     meloxicam (Mobic) 15 mg tablet, Take 1 tablet (15 mg) by mouth once daily., Disp: , Rfl:     multivitamin tablet, Take by mouth., Disp: , Rfl:     potassium gluconate 595 mg (99 mg) tablet, Take by mouth., Disp: , Rfl:     cyclobenzaprine (Flexeril) 10 mg  tablet, Take 1 tablet (10 mg) by mouth 3 times a day as needed for muscle spasms., Disp: 30 tablet, Rfl: 0    lidocaine (Lidoderm) 5 % patch, Place 1 patch over 12 hours on the skin once daily. Remove & discard patch within 12 hours or as directed by MD., Disp: 14 patch, Rfl: 0    traMADol (Ultram) 50 mg tablet, TAKE 1 TABLET BY MOUTH once or TWICE DAILY AS NEEDED FOR SEVERE PAIN (Patient taking differently: Take 1 tablet (50 mg) by mouth 3 times a day.), Disp: , Rfl:   [5]   Allergies  Allergen Reactions    Bee Venom Protein (Honey Bee) Other    Codeine Other    Penicillins Other

## 2025-07-01 ENCOUNTER — HOSPITAL ENCOUNTER (OUTPATIENT)
Dept: RADIOLOGY | Facility: CLINIC | Age: 65
Discharge: HOME | End: 2025-07-01
Payer: COMMERCIAL

## 2025-07-01 DIAGNOSIS — M81.0 AGE RELATED OSTEOPOROSIS, UNSPECIFIED PATHOLOGICAL FRACTURE PRESENCE: ICD-10-CM

## 2025-07-01 DIAGNOSIS — S22.000A THORACIC COMPRESSION FRACTURE, CLOSED, INITIAL ENCOUNTER (MULTI): ICD-10-CM

## 2025-07-01 PROCEDURE — 77080 DXA BONE DENSITY AXIAL: CPT

## 2025-07-01 PROCEDURE — 77080 DXA BONE DENSITY AXIAL: CPT | Performed by: STUDENT IN AN ORGANIZED HEALTH CARE EDUCATION/TRAINING PROGRAM

## 2025-07-07 ENCOUNTER — TELEPHONE (OUTPATIENT)
Dept: NEUROSURGERY | Facility: CLINIC | Age: 65
End: 2025-07-07
Payer: COMMERCIAL

## 2025-07-07 NOTE — TELEPHONE ENCOUNTER
Result Communication    Resulted Orders   XR DEXA bone density    Narrative    Interpreted By:  Oumou Lima,  and Admin Dexa   STUDY:  DEXA BONE DENSITY7/1/2025 11:47 am      INDICATION:  Signs/Symptoms:r/o osteoporosis.      ,S22.000A Wedge compression fracture of unspecified thoracic  vertebra, initial encounter for closed fracture (Multi),M81.0  Age-related osteoporosis without current pathological fracture      COMPARISON:  None.      ACCESSION NUMBER(S):  [id number]      ORDERING CLINICIAN:  SAMANTHA MEESON      TECHNIQUE:  DEXA BONE DENSITY      FINDINGS:      Name: Albina Ramirez  Patient ID: [id number] YOB: 1960 Height: 60.0 in.  Gender:     Female   Exam Date:  07/01/2025 Weight: 110.0 lbs.  Indications: Age related osteoporosis unspecified pathological  fracture presence, Thoracic compression fracture closed initial  encounter (Multi), History of Fracture (Adult) Fractures:  Treatments:  Calcium, Vitamin D      LEFT FEMUR - TOTAL  The bone mineral density : 0.635 g/cm2  T-score : -3.0    % of young normal mean :63 %  Z-score : -1.9    % of age matched mean : 73 %  % change vs. Previous : -     % change vs. Baseline : baseline  *Indicates significant change based on 95% confidence interval.      LEFT FEMUR - NECK  The bone mineral density : 0.659 g/cm2  T-score : -2.7    % of young normal mean: 64 %  Z-score : -1.4    % of age matched mean : 78 %  % change vs. Previous : -     % change vs. Baseline : baseline  *Indicates significant change based on 95% confidence interval.      SPINE L1-L4  The bone mineral density is 0.868 g/cm2  T-score : -2.6   % of young normal mean is 73 %  Z-score : -1.2    % of age matched mean is 85 %  % change vs. Previous : -     % change vs. Baseline : baseline  *Indicates significant change based on 95% confidence interval.      World Health Organization (WHO) criteria for post-menopausal,   Women: Normal:       T-score at or above -1 SD  Osteopenia:    T-score between -1 and -2.5 SD  Osteoporosis: T-score at or below -2.5 SD      10-Year Fracture Risk:  Major Osteoporotic Fracture -  Hip Fracture                -  Reported Risk Factors       History of Fracture (Adult)        Impression        According to World Health Organization criteria, classification is  osteoporosis.      Followup recommended in two years or sooner as clinically warranted.      FRAX and Z-Score calculations include ethnicity in determining the  score for these values, as determined by organizations such as the  NIH, WHO and NOF.      MACRO:  None      Signed by: Oumou Lima 7/3/2025 4:33 PM  Dictation workstation:   [id number]       4:04 PM      Results were successfully communicated with the patient and they acknowledged their understanding.    Reviewed with patient that her diagnosis is osteoporosis.  I encouraged her to reach out to her primary care in regards to treatment options.  She is still awaiting the decision of whether to proceed with kyphoplasty.  She will contact my office as needed for further management.    Samantha Meeson, MSN, NP-C  Adult-Gerontology Associate Nurse Practitioner  Department of Neurosurgery- Spine [place]  Main phone [phone]  Fax [phone]

## 2025-07-09 ENCOUNTER — TELEPHONE (OUTPATIENT)
Facility: CLINIC | Age: 65
End: 2025-07-09
Payer: COMMERCIAL

## 2025-07-09 DIAGNOSIS — S22.000A THORACIC COMPRESSION FRACTURE, CLOSED, INITIAL ENCOUNTER (MULTI): Primary | ICD-10-CM

## 2025-07-17 NOTE — PROGRESS NOTES
Subjective   Patient ID: Albina Ramirez is a 64 y.o. female who presents for Follow-up (DEXA scan showed osteoporosis, was told she needed to be on something/Back pain 7/10, muscle spasms, states no one will prescribe her something for it, Flexeril helped but is /Doesn't sleep well).    Recently dealing with back pain and compression fracture and working with neurosurgery.   Last bloodwork 2024    History of Present Illness  The patient presents for back pain, osteoporosis, and sleep issues.    She reports persistent back pain that has not shown any improvement. She has been informed that she is a potential candidate for kyphoplasty, but this will be confirmed after her x-rays are reviewed on 2025. She has been unable to schedule an appointment with the interventional radiologist due to their unavailability until 2025. She also mentions that she was unable to secure an appointment with Dr. Tsang. She expresses concern about the possibility of being deemed unsuitable for kyphoplasty and the lack of alternative treatment options. She has not previously taken Fosamax or Boniva or any other medication for osteoporosis.     She was prescribed lidocaine patches, but her insurance did not cover them. She was given 7 to 10 hydrocodone tablets, which provided some relief without any adverse reactions. She used to take Flexeril, which was beneficial, but she is no longer on it. She takes tramadol once daily, or twice if the pain is severe, but it does not seem to alleviate her back pain. It is prescribed for her fibromyalgia.  She has informed her pain management specialist about this, but they have not made any changes to her medication. She was initially refused pain medication by her primary care provider and was referred to pain management, who also declined to prescribe any medication due to her use of tramadol. She then consulted a neurosurgeon who prescribed hydrocodone. She has since returned  to pain management and they have approved the temporary use of hydrocodone. She finds hydrocodone more effective for her back pain than tramadol. She typically took hydrocodone once a day, but on particularly painful days, she would take one dose during the day and another at night to aid sleep.     She is currently experiencing sleep disturbances, which she attributes to her overall health condition. She reports that her mind races when she tries to sleep. She found that Flexeril slightly improved her sleep quality.    She lives alone and does not have anyone to drive her to appointments or procedures.    Social History:  Sleep: Reports sleep disturbances, mind races when trying to sleep  Living Condition: Lives alone    PAST SURGICAL HISTORY:  Colonoscopy with diagnosis of lymphocytic colitis treated with a 40-day course of steroids    Objective     /69 (BP Location: Right arm, Patient Position: Sitting, BP Cuff Size: Adult)   Pulse 71   Temp 36.4 °C (97.5 °F) (Temporal)   Resp 12   Wt 50.4 kg (111 lb 1.6 oz)   SpO2 94%   BMI 21.70 kg/m²      Physical Exam     Assessment & Plan  1. Back pain.  - The patient's back pain persists despite previous treatments.  - She has been unable to schedule an appointment with the interventional radiologist due to availability issues.  - The potential for kyphoplasty as a treatment option was discussed, but it depends on the radiologist's assessment of her x-rays.  - A prescription for hydrocodone 5 mg, a 7-day supply, will be provided for breakthrough pain. A prescription for Flexeril will also be given to aid in sleep and muscle relaxation. Communication with the pain management team will be maintained to ensure no conflicts with existing pain contracts.    2. Osteoporosis.  - The initiation of osteoporosis treatment is under consideration but will be deferred until confirmation from the neurosurgeon that there is no issue with starting bisphosphanate during  healing  - The patient has not been on previous osteoporosis treatments like Fosamax or Boniva.  - The risks and benefits of daily, weekly, and monthly osteoporosis medications were discussed, including potential gastrointestinal side effects.  - Once confirmation is received, the appropriate osteoporosis medication will be prescribed.    3. Sleep issues.  - The patient reports difficulty sleeping, which may be related to her back pain and overall discomfort.  - Flexeril will be prescribed as it may help with both muscle relaxation and sleep.  - The effectiveness of Flexeril for sleep will be monitored.  - No additional sleep medications will be prescribed at this time to avoid overmedication.    Patricia Meier MD     This medical note was created with the assistance of artificial intelligence (AI) for documentation purposes. The content has been reviewed and confirmed by the healthcare provider for accuracy and completeness. Patient consented to the use of audio recording and use of AI during their visit.

## 2025-07-21 ENCOUNTER — APPOINTMENT (OUTPATIENT)
Dept: PRIMARY CARE | Facility: CLINIC | Age: 65
End: 2025-07-21
Payer: COMMERCIAL

## 2025-07-21 ENCOUNTER — TELEPHONE (OUTPATIENT)
Dept: PRIMARY CARE | Facility: CLINIC | Age: 65
End: 2025-07-21

## 2025-07-21 VITALS
SYSTOLIC BLOOD PRESSURE: 120 MMHG | BODY MASS INDEX: 21.7 KG/M2 | OXYGEN SATURATION: 94 % | RESPIRATION RATE: 12 BRPM | DIASTOLIC BLOOD PRESSURE: 69 MMHG | TEMPERATURE: 97.5 F | WEIGHT: 111.1 LBS | HEART RATE: 71 BPM

## 2025-07-21 DIAGNOSIS — M80.00XG AGE-RELATED OSTEOPOROSIS WITH CURRENT PATHOLOGICAL FRACTURE WITH DELAYED HEALING, SUBSEQUENT ENCOUNTER: ICD-10-CM

## 2025-07-21 DIAGNOSIS — J44.9 CHRONIC OBSTRUCTIVE PULMONARY DISEASE, UNSPECIFIED COPD TYPE (MULTI): ICD-10-CM

## 2025-07-21 DIAGNOSIS — S22.000G COMPRESSION FRACTURE OF THORACIC VERTEBRA WITH DELAYED HEALING, UNSPECIFIED THORACIC VERTEBRAL LEVEL, SUBSEQUENT ENCOUNTER: Primary | ICD-10-CM

## 2025-07-21 PROBLEM — J43.9 EMPHYSEMA/COPD (MULTI): Status: RESOLVED | Noted: 2023-07-18 | Resolved: 2025-07-21

## 2025-07-21 PROCEDURE — 99214 OFFICE O/P EST MOD 30 MIN: CPT | Performed by: FAMILY MEDICINE

## 2025-07-21 RX ORDER — HYDROCODONE BITARTRATE AND ACETAMINOPHEN 5; 325 MG/1; MG/1
1 TABLET ORAL EVERY 6 HOURS PRN
Qty: 28 TABLET | Refills: 0 | Status: SHIPPED | OUTPATIENT
Start: 2025-07-21 | End: 2025-07-28

## 2025-07-21 RX ORDER — CYCLOBENZAPRINE HCL 10 MG
10 TABLET ORAL 3 TIMES DAILY PRN
Qty: 90 TABLET | Refills: 1 | Status: SHIPPED | OUTPATIENT
Start: 2025-07-21 | End: 2025-10-19

## 2025-07-21 RX ORDER — ALENDRONATE SODIUM 5 MG/1
5 TABLET ORAL
Qty: 90 TABLET | Refills: 3 | Status: SHIPPED | OUTPATIENT
Start: 2025-07-21 | End: 2025-07-22 | Stop reason: DRUGHIGH

## 2025-07-21 ASSESSMENT — PATIENT HEALTH QUESTIONNAIRE - PHQ9
2. FEELING DOWN, DEPRESSED OR HOPELESS: NOT AT ALL
1. LITTLE INTEREST OR PLEASURE IN DOING THINGS: NOT AT ALL
SUM OF ALL RESPONSES TO PHQ9 QUESTIONS 1 AND 2: 0

## 2025-07-22 DIAGNOSIS — M80.00XG AGE-RELATED OSTEOPOROSIS WITH CURRENT PATHOLOGICAL FRACTURE WITH DELAYED HEALING, SUBSEQUENT ENCOUNTER: Primary | ICD-10-CM

## 2025-07-22 DIAGNOSIS — M80.00XG AGE-RELATED OSTEOPOROSIS WITH CURRENT PATHOLOGICAL FRACTURE WITH DELAYED HEALING, SUBSEQUENT ENCOUNTER: ICD-10-CM

## 2025-07-22 RX ORDER — ALENDRONATE SODIUM 5 MG/1
5 TABLET ORAL
Qty: 90 TABLET | Refills: 3 | OUTPATIENT
Start: 2025-07-22 | End: 2026-07-22

## 2025-07-22 RX ORDER — ALENDRONATE SODIUM 10 MG/1
10 TABLET ORAL
Qty: 90 TABLET | Refills: 3 | Status: SHIPPED | OUTPATIENT
Start: 2025-07-22 | End: 2026-07-22

## 2025-07-22 NOTE — TELEPHONE ENCOUNTER
We are safely able to start the treatment for osteoporosis now because she is more than 2 weeks out from the initial fracture. New prescription sent in for alendronate 5 mg daily.

## 2025-07-22 NOTE — ASSESSMENT & PLAN NOTE
Stable on daily pulmicort and as needed albuterol. Does not see pulmonary specialist at this time.

## 2025-07-28 ENCOUNTER — PREP FOR PROCEDURE (OUTPATIENT)
Dept: RADIOLOGY | Facility: HOSPITAL | Age: 65
End: 2025-07-28
Payer: COMMERCIAL

## 2025-07-28 DIAGNOSIS — M80.08XA: Primary | ICD-10-CM

## 2025-08-12 ENCOUNTER — HOSPITAL ENCOUNTER (OUTPATIENT)
Dept: RADIOLOGY | Facility: HOSPITAL | Age: 65
Discharge: HOME | End: 2025-08-12
Payer: COMMERCIAL

## 2025-08-12 ENCOUNTER — APPOINTMENT (OUTPATIENT)
Dept: RADIOLOGY | Facility: HOSPITAL | Age: 65
End: 2025-08-12
Payer: COMMERCIAL

## 2025-08-12 ENCOUNTER — PREP FOR PROCEDURE (OUTPATIENT)
Dept: RADIOLOGY | Facility: HOSPITAL | Age: 65
End: 2025-08-12
Payer: COMMERCIAL

## 2025-08-12 ENCOUNTER — HOSPITAL ENCOUNTER (OUTPATIENT)
Dept: RADIOLOGY | Facility: CLINIC | Age: 65
Discharge: HOME | End: 2025-08-12
Payer: COMMERCIAL

## 2025-08-12 DIAGNOSIS — M80.08XA: Primary | ICD-10-CM

## 2025-08-12 DIAGNOSIS — M80.08XA: ICD-10-CM

## 2025-08-12 DIAGNOSIS — S22.000A THORACIC COMPRESSION FRACTURE, CLOSED, INITIAL ENCOUNTER (MULTI): ICD-10-CM

## 2025-08-12 PROCEDURE — 72146 MRI CHEST SPINE W/O DYE: CPT | Performed by: STUDENT IN AN ORGANIZED HEALTH CARE EDUCATION/TRAINING PROGRAM

## 2025-08-12 PROCEDURE — 72146 MRI CHEST SPINE W/O DYE: CPT

## 2025-08-29 ENCOUNTER — OFFICE VISIT (OUTPATIENT)
Dept: URGENT CARE | Age: 65
End: 2025-08-29
Payer: COMMERCIAL

## 2025-08-29 VITALS
SYSTOLIC BLOOD PRESSURE: 150 MMHG | WEIGHT: 113 LBS | TEMPERATURE: 97.9 F | BODY MASS INDEX: 22.19 KG/M2 | HEIGHT: 60 IN | RESPIRATION RATE: 22 BRPM | DIASTOLIC BLOOD PRESSURE: 71 MMHG | HEART RATE: 74 BPM | OXYGEN SATURATION: 96 %

## 2025-08-29 DIAGNOSIS — R10.13 EPIGASTRIC PAIN: Primary | ICD-10-CM

## 2025-08-29 LAB
POC APPEARANCE, URINE: CLEAR
POC BILIRUBIN, URINE: NEGATIVE
POC BLOOD, URINE: NEGATIVE
POC COLOR, URINE: YELLOW
POC GLUCOSE, URINE: NEGATIVE MG/DL
POC KETONES, URINE: NEGATIVE MG/DL
POC LEUKOCYTES, URINE: NEGATIVE
POC NITRITE,URINE: NEGATIVE
POC PH, URINE: 6 PH
POC PROTEIN, URINE: NEGATIVE MG/DL
POC SPECIFIC GRAVITY, URINE: 1.01
POC UROBILINOGEN, URINE: 0.2 EU/DL

## 2025-08-29 PROCEDURE — 81003 URINALYSIS AUTO W/O SCOPE: CPT | Performed by: NURSE PRACTITIONER

## 2025-08-29 PROCEDURE — 93000 ELECTROCARDIOGRAM COMPLETE: CPT | Performed by: NURSE PRACTITIONER

## 2025-08-29 PROCEDURE — 99215 OFFICE O/P EST HI 40 MIN: CPT | Performed by: NURSE PRACTITIONER

## 2025-08-29 PROCEDURE — 3008F BODY MASS INDEX DOCD: CPT | Performed by: NURSE PRACTITIONER

## 2025-08-29 PROCEDURE — 1036F TOBACCO NON-USER: CPT | Performed by: NURSE PRACTITIONER

## 2025-08-29 ASSESSMENT — ENCOUNTER SYMPTOMS
FEVER: 0
COUGH: 0
CHILLS: 0
DIAPHORESIS: 0
VOMITING: 1
FATIGUE: 0
DIARRHEA: 0
MYALGIAS: 0
SHORTNESS OF BREATH: 0
BLOOD IN STOOL: 0
DYSURIA: 0
CONSTIPATION: 0
FLANK PAIN: 0
DIFFICULTY URINATING: 0
NAUSEA: 0
HEADACHES: 0
PALPITATIONS: 0
FREQUENCY: 0
ABDOMINAL DISTENTION: 0
ABDOMINAL PAIN: 1
DIZZINESS: 0

## 2025-08-29 ASSESSMENT — PATIENT HEALTH QUESTIONNAIRE - PHQ9
SUM OF ALL RESPONSES TO PHQ9 QUESTIONS 1 AND 2: 0
1. LITTLE INTEREST OR PLEASURE IN DOING THINGS: NOT AT ALL
2. FEELING DOWN, DEPRESSED OR HOPELESS: NOT AT ALL

## 2025-08-29 ASSESSMENT — PAIN SCALES - GENERAL: PAINLEVEL_OUTOF10: 7

## 2025-09-04 ENCOUNTER — HOSPITAL ENCOUNTER (OUTPATIENT)
Dept: RADIOLOGY | Facility: HOSPITAL | Age: 65
Discharge: HOME | End: 2025-09-04
Payer: COMMERCIAL

## 2025-09-04 VITALS
SYSTOLIC BLOOD PRESSURE: 118 MMHG | DIASTOLIC BLOOD PRESSURE: 67 MMHG | TEMPERATURE: 97.5 F | OXYGEN SATURATION: 95 % | RESPIRATION RATE: 16 BRPM | HEART RATE: 66 BPM

## 2025-09-04 DIAGNOSIS — M80.08XA AGE-RELATED OSTEOPOROSIS WITH CURRENT PATHOLOGICAL FRACTURE, VERTEBRA(E), INITIAL ENCOUNTER FOR FRACTURE (MULTI): ICD-10-CM

## 2025-09-04 LAB
INR PPP: 1 (ref 0.9–1.1)
PLATELET # BLD AUTO: 251 X10*3/UL (ref 150–450)
PROTHROMBIN TIME: 11.3 SECONDS (ref 9.8–12.4)

## 2025-09-04 PROCEDURE — 36415 COLL VENOUS BLD VENIPUNCTURE: CPT | Performed by: RADIOLOGY

## 2025-09-04 PROCEDURE — 85610 PROTHROMBIN TIME: CPT | Performed by: RADIOLOGY

## 2025-09-04 PROCEDURE — 2500000004 HC RX 250 GENERAL PHARMACY W/ HCPCS (ALT 636 FOR OP/ED): Performed by: RADIOLOGY

## 2025-09-04 PROCEDURE — 7100000009 HC PHASE TWO TIME - INITIAL BASE CHARGE

## 2025-09-04 PROCEDURE — 85049 AUTOMATED PLATELET COUNT: CPT | Performed by: RADIOLOGY

## 2025-09-04 PROCEDURE — 2780000003 HC OR 278 NO HCPCS

## 2025-09-04 PROCEDURE — 99153 MOD SED SAME PHYS/QHP EA: CPT

## 2025-09-04 PROCEDURE — 7100000010 HC PHASE TWO TIME - EACH INCREMENTAL 1 MINUTE

## 2025-09-04 PROCEDURE — 2720000007 HC OR 272 NO HCPCS

## 2025-09-04 PROCEDURE — 22514 PERQ VERTEBRAL AUGMENTATION: CPT

## 2025-09-04 PROCEDURE — C1713 ANCHOR/SCREW BN/BN,TIS/BN: HCPCS

## 2025-09-04 PROCEDURE — 99152 MOD SED SAME PHYS/QHP 5/>YRS: CPT

## 2025-09-04 PROCEDURE — 2500000005 HC RX 250 GENERAL PHARMACY W/O HCPCS: Performed by: RADIOLOGY

## 2025-09-04 RX ORDER — LIDOCAINE HYDROCHLORIDE 10 MG/ML
INJECTION, SOLUTION EPIDURAL; INFILTRATION; INTRACAUDAL; PERINEURAL
Status: COMPLETED | OUTPATIENT
Start: 2025-09-04 | End: 2025-09-04

## 2025-09-04 RX ORDER — MIDAZOLAM HYDROCHLORIDE 1 MG/ML
INJECTION, SOLUTION INTRAMUSCULAR; INTRAVENOUS
Status: COMPLETED | OUTPATIENT
Start: 2025-09-04 | End: 2025-09-04

## 2025-09-04 RX ORDER — FENTANYL CITRATE 50 UG/ML
INJECTION, SOLUTION INTRAMUSCULAR; INTRAVENOUS
Status: COMPLETED | OUTPATIENT
Start: 2025-09-04 | End: 2025-09-04

## 2025-09-04 RX ORDER — VANCOMYCIN HYDROCHLORIDE 500 MG/100ML
500 INJECTION, SOLUTION INTRAVENOUS ONCE
Status: COMPLETED | OUTPATIENT
Start: 2025-09-04 | End: 2025-09-04

## 2025-09-04 RX ADMIN — FENTANYL CITRATE 50 MCG: 50 INJECTION, SOLUTION INTRAMUSCULAR; INTRAVENOUS at 09:47

## 2025-09-04 RX ADMIN — LIDOCAINE HYDROCHLORIDE 5 ML: 10 INJECTION, SOLUTION EPIDURAL; INFILTRATION; INTRACAUDAL; PERINEURAL at 09:47

## 2025-09-04 RX ADMIN — FENTANYL CITRATE 50 MCG: 50 INJECTION, SOLUTION INTRAMUSCULAR; INTRAVENOUS at 09:37

## 2025-09-04 RX ADMIN — FENTANYL CITRATE 50 MCG: 50 INJECTION, SOLUTION INTRAMUSCULAR; INTRAVENOUS at 10:04

## 2025-09-04 RX ADMIN — MIDAZOLAM 1 MG: 1 INJECTION INTRAMUSCULAR; INTRAVENOUS at 10:04

## 2025-09-04 RX ADMIN — VANCOMYCIN HYDROCHLORIDE 500 MG: 500 INJECTION, SOLUTION INTRAVENOUS at 08:42

## 2025-09-04 RX ADMIN — LIDOCAINE HYDROCHLORIDE 5 ML: 10 INJECTION, SOLUTION EPIDURAL; INFILTRATION; INTRACAUDAL; PERINEURAL at 09:37

## 2025-09-04 RX ADMIN — MIDAZOLAM 1 MG: 1 INJECTION INTRAMUSCULAR; INTRAVENOUS at 09:47

## 2025-09-04 RX ADMIN — MIDAZOLAM 1 MG: 1 INJECTION INTRAMUSCULAR; INTRAVENOUS at 09:25

## 2025-09-04 RX ADMIN — LIDOCAINE HYDROCHLORIDE 5 ML: 10 INJECTION, SOLUTION EPIDURAL; INFILTRATION; INTRACAUDAL; PERINEURAL at 09:29

## 2025-09-04 RX ADMIN — MIDAZOLAM 1 MG: 1 INJECTION INTRAMUSCULAR; INTRAVENOUS at 09:37

## 2025-09-04 RX ADMIN — FENTANYL CITRATE 50 MCG: 50 INJECTION, SOLUTION INTRAMUSCULAR; INTRAVENOUS at 09:25

## 2025-09-04 RX ADMIN — Medication: at 08:15

## 2025-09-04 ASSESSMENT — PAIN - FUNCTIONAL ASSESSMENT

## 2025-09-04 ASSESSMENT — PAIN SCALES - GENERAL
PAINLEVEL_OUTOF10: 3
PAINLEVEL_OUTOF10: 0 - NO PAIN
PAINLEVEL_OUTOF10: 3
PAINLEVEL_OUTOF10: 6
PAINLEVEL_OUTOF10: 2
PAINLEVEL_OUTOF10: 5 - MODERATE PAIN
PAINLEVEL_OUTOF10: 3
PAINLEVEL_OUTOF10: 0 - NO PAIN
PAINLEVEL_OUTOF10: 0 - NO PAIN
PAINLEVEL_OUTOF10: 3
PAINLEVEL_OUTOF10: 0 - NO PAIN
PAINLEVEL_OUTOF10: 0 - NO PAIN
PAINLEVEL_OUTOF10: 2
PAINLEVEL_OUTOF10: 4
PAINLEVEL_OUTOF10: 6
PAINLEVEL_OUTOF10: 0 - NO PAIN
PAINLEVEL_OUTOF10: 3
PAINLEVEL_OUTOF10: 5 - MODERATE PAIN
PAINLEVEL_OUTOF10: 3
PAINLEVEL_OUTOF10: 0 - NO PAIN
PAINLEVEL_OUTOF10: 3

## 2025-09-04 ASSESSMENT — PAIN DESCRIPTION - DESCRIPTORS
DESCRIPTORS: ACHING

## 2025-09-05 ASSESSMENT — PAIN SCALES - GENERAL: PAINLEVEL_OUTOF10: 3
